# Patient Record
Sex: FEMALE | Race: BLACK OR AFRICAN AMERICAN | Employment: FULL TIME | URBAN - METROPOLITAN AREA
[De-identification: names, ages, dates, MRNs, and addresses within clinical notes are randomized per-mention and may not be internally consistent; named-entity substitution may affect disease eponyms.]

---

## 2018-02-26 ENCOUNTER — HOSPITAL ENCOUNTER (EMERGENCY)
Age: 53
Discharge: HOME | End: 2018-02-26
Attending: EMERGENCY MEDICINE
Payer: OTHER GOVERNMENT

## 2018-02-26 ENCOUNTER — APPOINTMENT (OUTPATIENT)
Dept: GENERAL RADIOLOGY | Age: 53
End: 2018-02-26
Attending: PHYSICIAN ASSISTANT
Payer: OTHER GOVERNMENT

## 2018-02-26 VITALS
TEMPERATURE: 98.4 F | HEIGHT: 66 IN | HEART RATE: 91 BPM | RESPIRATION RATE: 18 BRPM | DIASTOLIC BLOOD PRESSURE: 81 MMHG | OXYGEN SATURATION: 100 % | SYSTOLIC BLOOD PRESSURE: 125 MMHG | BODY MASS INDEX: 25.71 KG/M2 | WEIGHT: 160 LBS

## 2018-02-26 DIAGNOSIS — J06.9 ACUTE URI: Primary | ICD-10-CM

## 2018-02-26 PROCEDURE — 87081 CULTURE SCREEN ONLY: CPT | Performed by: EMERGENCY MEDICINE

## 2018-02-26 PROCEDURE — 99283 EMERGENCY DEPT VISIT LOW MDM: CPT

## 2018-02-26 PROCEDURE — 71046 X-RAY EXAM CHEST 2 VIEWS: CPT

## 2018-02-26 RX ORDER — BENZONATATE 100 MG/1
100 CAPSULE ORAL
Qty: 30 CAP | Refills: 0 | Status: SHIPPED | OUTPATIENT
Start: 2018-02-26 | End: 2018-03-05

## 2018-02-26 NOTE — ED PROVIDER NOTES
EMERGENCY DEPARTMENT HISTORY AND PHYSICAL EXAM    Date: 2/26/2018  Patient Name: Joao Antunez    History of Presenting Illness     Chief Complaint   Patient presents with    Cold Symptoms         History Provided By: Patient    Chief Complaint:[place]  Duration: 2 Days  Timing:  Acute and Constant  Location: head  Severity: Moderate  Modifying Factors: none  Associated Symptoms: sore throat, non productive cough, and vomiting    Additional History (Context):   5:18 PM   Joao Antunez is a 47 y.o. female with no significant PMHX who presents to the emergency department C/O head congestion. Associated sxs include sore throat, non productive cough, vomiting. Pt reports she has been sick for 2 days and believes she is vomiting because of her cough. Pt was being seen at [facility] for her Myasthenia gravis. Pt endorses sick contact with  (was sick for 7 days. Pt denies fever, chills, dizziness, headache, hx of DM, and any other sxs or complaints. PCP: JAQUELINE Abbott    Current Outpatient Prescriptions   Medication Sig Dispense Refill    benzonatate (TESSALON PERLES) 100 mg capsule Take 1 Cap by mouth three (3) times daily as needed for Cough for up to 7 days. 30 Cap 0    mycophenolate (CELLCEPT) 500 mg tablet Take 500 mg by mouth two (2) times a day.  predniSONE (DELTASONE) 2.5 mg tablet Take  by mouth daily (with breakfast).  norethindrone-e.estradiol-iron (LO LOESTRIN FE) 1 mg-10 mcg (24)/10 mcg (2) tab Take  by mouth.  levothyroxine (SYNTHROID) 100 mcg tablet Take 100 mcg by mouth Daily (before breakfast).  simvastatin (ZOCOR) 40 mg tablet Take  by mouth nightly. Past History     Past Medical History:  Past Medical History:   Diagnosis Date    Back pain     Hypercholesteremia     Myasthenia gravis ([address]     Thyroid disease     Tremor in face after taking mestinon. Past Surgical History:  History reviewed. No pertinent surgical history.     Family History:  History reviewed. No pertinent family history. Social History:  Social History   Substance Use Topics    Smoking status: Never Smoker    Smokeless tobacco: None    Alcohol use None       Allergies: Allergies   Allergen Reactions    Amoxicillin Hives    Pcn [Penicillins] Hives         Review of Systems   Review of Systems   Constitutional: Negative for chills and fever. HENT: Positive for congestion (head) and sore throat. Respiratory: Positive for cough (non productive). Gastrointestinal: Positive for vomiting. Neurological: Negative for dizziness and headaches. All other systems reviewed and are negative. Physical Exam     Vitals:    02/26/18 1658   BP: 125/81   Pulse: 91   Resp: 18   Temp: 98.4 °F (36.9 °C)   SpO2: 100%   Weight: 72.6 kg (160 lb)   Height: 5' 6\" (1.676 m)     Physical Exam   Constitutional: She is oriented to person, place, and time. She appears well-developed and well-nourished. No distress. HENT:   Head: Normocephalic and atraumatic. Right Ear: External ear normal.   Left Ear: External ear normal.   Nose: Nose normal.   Mouth/Throat: Oropharynx is clear and moist. No oropharyngeal exudate. Eyes: Conjunctivae and EOM are normal. Pupils are equal, round, and reactive to light. Neck: Normal range of motion. Neck supple. Cardiovascular: Normal rate and regular rhythm. Pulmonary/Chest: Effort normal and breath sounds normal. She has no wheezes. Dry hacking type cough   Abdominal: Soft. Bowel sounds are normal. She exhibits no distension. There is no tenderness. There is no rebound and no guarding. Musculoskeletal: Normal range of motion. She exhibits no edema or tenderness. Neurological: She is alert and oriented to person, place, and time. Skin: Skin is warm and dry. Psychiatric: She has a normal mood and affect. Her behavior is normal.   Nursing note and vitals reviewed.         Diagnostic Study Results     Labs -   No results found for this or any previous visit (from the past 12 hour(s)). Radiologic Studies -     CT Results  (Last 48 hours)    None        CXR Results  (Last 48 hours)    None        6:07 PM  RADIOLOGY FINDINGS  Chest X-ray shows NAP  Pending review by Radiologist  Recorded by Juanito Montague ED Scribe, as dictated by Michael Glover PA-C     Medications given in the ED-  Medications - No data to display      Medical Decision Making   I am the first provider for this patient. I reviewed the vital signs, available nursing notes, past medical history, past surgical history, family history and social history. Vital Signs-Reviewed the patient's vital signs. Pulse Oximetry Analysis - 100% on RA     Records Reviewed: Nursing Notes    Procedures:  Procedures    ED Course:   5:18 PM  Initial assessment performed. The patients presenting problems have been discussed, and they are in agreement with the care plan formulated and outlined with them. I have encouraged them to ask questions as they arise throughout their visit. Diagnosis and Disposition       DISCHARGE NOTE:  6:48 PM   April Chi's  results have been reviewed with her. She has been counseled regarding her diagnosis, treatment, and plan. She verbally conveys understanding and agreement of the signs, symptoms, diagnosis, treatment and prognosis and additionally agrees to follow up as discussed. She also agrees with the care-plan and conveys that all of her questions have been answered. I have also provided discharge instructions for her that include: educational information regarding their diagnosis and treatment, and list of reasons why they would want to return to the ED prior to their follow-up appointment, should her condition change. She has been provided with education for proper emergency department utilization. CLINICAL IMPRESSION:    1. Acute URI        PLAN:  1. D/C Home  2.    Current Discharge Medication List      START taking these medications    Details   benzonatate (TESSALON PERLES) 100 mg capsule Take 1 Cap by mouth three (3) times daily as needed for Cough for up to 7 days. Qty: 30 Cap, Refills: 0           3. Follow-up Information     Follow up With Details Comments [address] [place] Schedule an appointment as soon as possible for a visit in 2 days For primary care follow up [facility] [place] [address] EMERGENCY DEPT Go to As needed, if symptoms worsen 2 [address] [address] [phone]        _______________________________    Attestations: This note is prepared by Madison Hester, acting as Scribe for Kylah Taylor PA-C. Kylah Taylor PA-C:  The scribe's documentation has been prepared under my direction and personally reviewed by me in its entirety.   I confirm that the note above accurately reflects all work, treatment, procedures, and medical decision making performed by me.  _______________________________

## 2018-02-26 NOTE — ED TRIAGE NOTES
Patient with c/o cold-like sxs that started Saturday. Sxs include sore throat, cough, HA, congestion, post-tussive vomiting. Patient reports taking Mucinex, last admin around 1300.

## 2018-02-26 NOTE — DISCHARGE INSTRUCTIONS
Upper Respiratory Infection (Cold): Care Instructions  Your Care Instructions    An upper respiratory infection, or URI, is an infection of the nose, sinuses, or throat. URIs are spread by coughs, sneezes, and direct contact. The common cold is the most frequent kind of URI. The flu and sinus infections are other kinds of URIs. Almost all URIs are caused by viruses. Antibiotics won't cure them. But you can treat most infections with home care. This may include drinking lots of fluids and taking over-the-counter pain medicine. You will probably feel better in 4 to 10 days. The doctor has checked you carefully, but problems can develop later. If you notice any problems or new symptoms, get medical treatment right away. Follow-up care is a key part of your treatment and safety. Be sure to make and go to all appointments, and call your doctor if you are having problems. It's also a good idea to know your test results and keep a list of the medicines you take. How can you care for yourself at home? · To prevent dehydration, drink plenty of fluids, enough so that your urine is light yellow or clear like water. Choose water and other caffeine-free clear liquids until you feel better. If you have kidney, heart, or liver disease and have to limit fluids, talk with your doctor before you increase the amount of fluids you drink. · Take an over-the-counter pain medicine, such as acetaminophen (Tylenol), ibuprofen (Advil, Motrin), or naproxen (Aleve). Read and follow all instructions on the label. · Before you use cough and cold medicines, check the label. These medicines may not be safe for young children or for people with certain health problems. · Be careful when taking over-the-counter cold or flu medicines and Tylenol at the same time. Many of these medicines have acetaminophen, which is Tylenol. Read the labels to make sure that you are not taking more than the recommended dose.  Too much acetaminophen (Tylenol) can be harmful. · Get plenty of rest.  · Do not smoke or allow others to smoke around you. If you need help quitting, talk to your doctor about stop-smoking programs and medicines. These can increase your chances of quitting for good. When should you call for help? Call 911 anytime you think you may need emergency care. For example, call if:  ? · You have severe trouble breathing. ?Call your doctor now or seek immediate medical care if:  ? · You seem to be getting much sicker. ? · You have new or worse trouble breathing. ? · You have a new or higher fever. ? · You have a new rash. ? Watch closely for changes in your health, and be sure to contact your doctor if:  ? · You have a new symptom, such as a sore throat, an earache, or sinus pain. ? · You cough more deeply or more often, especially if you notice more mucus or a change in the color of your mucus. ? · You do not get better as expected. Where can you learn more? Go to http://stephanie-roxana.info/. Enter K506 in the search box to learn more about \"Upper Respiratory Infection (Cold): Care Instructions. \"  Current as of: May 12, 2017  Content Version: 11.4  © 4826-9395 [employer], Incorporated. Care instructions adapted under license by [employer] (which disclaims liability or warranty for this information). If you have questions about a medical condition or this instruction, always ask your healthcare professional. [place] [address] any warranty or liability for your use of this information.

## 2018-02-26 NOTE — LETTER
[facility] [place] 
[facility] 
[place] [id number] [phone] Work/School Note Date: 2/26/2018 To Whom It May concern: 
 
Rk Gregory was seen and treated today in the emergency room by the following provider(s): 
Attending Provider: Yamile Pierce MD 
Physician Assistant: JAQUELINE Denny. Rk Gregory may return to work on 2/28/2018.  
 
Sincerely, 
 
 
 
 
Franca Davies PA-C

## 2018-02-28 LAB
B-HEM STREP THROAT QL CULT: NEGATIVE
B-HEM STREP THROAT QL CULT: NORMAL
BACTERIA SPEC CULT: NORMAL
SERVICE CMNT-IMP: NORMAL

## 2019-01-27 ENCOUNTER — HOSPITAL ENCOUNTER (EMERGENCY)
Age: 54
Discharge: HOME | End: 2019-01-27
Attending: EMERGENCY MEDICINE
Payer: OTHER GOVERNMENT

## 2019-01-27 VITALS
BODY MASS INDEX: 24.11 KG/M2 | DIASTOLIC BLOOD PRESSURE: 74 MMHG | HEIGHT: 66 IN | RESPIRATION RATE: 14 BRPM | TEMPERATURE: 98 F | HEART RATE: 83 BPM | SYSTOLIC BLOOD PRESSURE: 115 MMHG | WEIGHT: 150 LBS | OXYGEN SATURATION: 100 %

## 2019-01-27 DIAGNOSIS — M43.6 ACUTE TORTICOLLIS: Primary | ICD-10-CM

## 2019-01-27 PROCEDURE — 96372 THER/PROPH/DIAG INJ SC/IM: CPT

## 2019-01-27 PROCEDURE — 74011250637 HC RX REV CODE- 250/637: Performed by: PHYSICIAN ASSISTANT

## 2019-01-27 PROCEDURE — 99283 EMERGENCY DEPT VISIT LOW MDM: CPT

## 2019-01-27 PROCEDURE — 74011250636 HC RX REV CODE- 250/636: Performed by: PHYSICIAN ASSISTANT

## 2019-01-27 RX ORDER — DIAZEPAM 5 MG/1
5 TABLET ORAL ONCE
Status: COMPLETED | OUTPATIENT
Start: 2019-01-27 | End: 2019-01-27

## 2019-01-27 RX ORDER — IBUPROFEN 600 MG/1
600 TABLET ORAL
Qty: 20 TAB | Refills: 0 | Status: SHIPPED | OUTPATIENT
Start: 2019-01-27 | End: 2022-08-26

## 2019-01-27 RX ORDER — KETOROLAC TROMETHAMINE 30 MG/ML
60 INJECTION, SOLUTION INTRAMUSCULAR; INTRAVENOUS
Status: COMPLETED | OUTPATIENT
Start: 2019-01-27 | End: 2019-01-27

## 2019-01-27 RX ORDER — HYDROCODONE BITARTRATE AND ACETAMINOPHEN 5; 325 MG/1; MG/1
1 TABLET ORAL
Qty: 10 TAB | Refills: 0 | Status: SHIPPED | OUTPATIENT
Start: 2019-01-27 | End: 2022-08-26

## 2019-01-27 RX ORDER — ONDANSETRON 4 MG/1
4 TABLET, ORALLY DISINTEGRATING ORAL
Status: COMPLETED | OUTPATIENT
Start: 2019-01-27 | End: 2019-01-27

## 2019-01-27 RX ORDER — CHLORZOXAZONE 500 MG/1
500 TABLET ORAL
Qty: 21 TAB | Refills: 0 | Status: SHIPPED | OUTPATIENT
Start: 2019-01-27 | End: 2022-08-26

## 2019-01-27 RX ORDER — HYDROCODONE BITARTRATE AND ACETAMINOPHEN 5; 325 MG/1; MG/1
1 TABLET ORAL
Status: COMPLETED | OUTPATIENT
Start: 2019-01-27 | End: 2019-01-27

## 2019-01-27 RX ADMIN — ONDANSETRON 4 MG: 4 TABLET, ORALLY DISINTEGRATING ORAL at 12:11

## 2019-01-27 RX ADMIN — DIAZEPAM 5 MG: 5 TABLET ORAL at 12:10

## 2019-01-27 RX ADMIN — HYDROCODONE BITARTRATE AND ACETAMINOPHEN 1 TABLET: 5; 325 TABLET ORAL at 12:10

## 2019-01-27 RX ADMIN — KETOROLAC TROMETHAMINE 60 MG: 30 INJECTION, SOLUTION INTRAMUSCULAR at 12:12

## 2019-01-27 NOTE — ED PROVIDER NOTES
EMERGENCY DEPARTMENT HISTORY AND PHYSICAL EXAM    Date: 1/27/2019  Patient Name: Emmanuel Cornejo    History of Presenting Illness     Chief Complaint   Patient presents with    Neck Pain         History Provided By: Patient    Chief Complaint: neck pain  Duration: \"this morning\"  Timing:  Constant  Location: left neck  Quality: Aching and Sharp  Severity: Moderate  Modifying Factors: pain radiated down the back when the  tried to give her a massage  Associated Symptoms: denies any other associated signs or symptoms    Additional History (Context):   11:56 AM  Emmanuel Cornejo is a 47 y.o. female with PMHX of back pain who presents to the emergency department C/O left neck pain. No associated sxs Pt reports she was stretching while getting gout of the bed when she heard a noise and is unable to move her neck now. The pain radiates up and down the left neck. When her  massaged her neck the pain radiated down her back.  notes the pt has been moving her mother at the rehab center. LNMP x3 weeks ago. PMHx includes thyroid disease, hypercholesteremia, tremor, and myasthenia gravis. Denies hx of neck surgeries. Pt denies new physical activites, numbness, weakness, tingling, back pain, and any other sxs or complaints.      PCP: JAQUELINE Canseco    Current Facility-Administered Medications   Medication Dose Route Frequency Provider Last Rate Last Dose    ketorolac tromethamine (TORADOL) 60 mg/2 mL injection 60 mg  60 mg IntraMUSCular NOW Danis Moe PA-C        diazePAM (VALIUM) tablet 5 mg  5 mg Oral ONCE Danis Moe PA-C        HYDROcodone-acetaminophen (NORCO) 5-325 mg per tablet 1 Tab  1 Tab Oral NOW Danis Moe PA-C        ondansetron (ZOFRAN ODT) tablet 4 mg  4 mg Oral NOW Danis Moe PA-C         Current Outpatient Medications   Medication Sig Dispense Refill    chlorzoxazone (PARAFON FORTE DSC) 500 mg tablet Take 1 Tab by mouth three (3) times daily as needed for Muscle Spasm(s). 21 Tab 0    HYDROcodone-acetaminophen (NORCO) 5-325 mg per tablet Take 1 Tab by mouth every four (4) hours as needed for Pain. Max Daily Amount: 6 Tabs. 10 Tab 0    ibuprofen (MOTRIN) 600 mg tablet Take 1 Tab by mouth every six (6) hours as needed for Pain. Take with food. 20 Tab 0    mycophenolate (CELLCEPT) 500 mg tablet Take 500 mg by mouth two (2) times a day.  norethindrone-e.estradiol-iron (LO LOESTRIN FE) 1 mg-10 mcg (24)/10 mcg (2) tab Take  by mouth.  levothyroxine (SYNTHROID) 100 mcg tablet Take 100 mcg by mouth Daily (before breakfast).  simvastatin (ZOCOR) 40 mg tablet Take  by mouth nightly. Past History     Past Medical History:  Past Medical History:   Diagnosis Date    Back pain     Hypercholesteremia     Myasthenia gravis ([address]     Thyroid disease     Tremor in face after taking mestinon. Past Surgical History:  History reviewed. No pertinent surgical history. Family History:  History reviewed. No pertinent family history. Social History:  Social History     Tobacco Use    Smoking status: Never Smoker    Smokeless tobacco: Never Used   Substance Use Topics    Alcohol use: No     Frequency: Never    Drug use: No       Allergies: Allergies   Allergen Reactions    Amoxicillin Hives    Pcn [Penicillins] Hives         Review of Systems   Review of Systems   Musculoskeletal: Positive for myalgias and neck pain (left neck pain). Neurological: Negative for numbness and headaches. (-) tingling   All other systems reviewed and are negative. Physical Exam     Vitals:    01/27/19 1123   BP: 115/74   Pulse: 83   Resp: 14   Temp: 98 °F (36.7 °C)   SpO2: 100%   Weight: 68 kg (150 lb)   Height: 5' 6\" (1.676 m)     Physical Exam   Constitutional: She is oriented to person, place, and time. She appears well-developed and well-nourished. No distress. AA female in NAD. Alert. Social smile. Limited rotation of neck. SO at bedside. HENT:   Head: Normocephalic and atraumatic. Right Ear: External ear normal.   Left Ear: External ear normal.   Eyes: Conjunctivae are normal.   Neck: Muscular tenderness present. No spinous process tenderness present. Decreased range of motion present. Cardiovascular: Normal rate, regular rhythm, normal heart sounds and intact distal pulses. Exam reveals no gallop and no friction rub. No murmur heard. Pulmonary/Chest: Effort normal and breath sounds normal. No accessory muscle usage. No tachypnea. She has no decreased breath sounds. She has no rhonchi. Musculoskeletal:   5/5 strength in BUE   Neurological: She is alert and oriented to person, place, and time. Skin: Skin is warm and dry. She is not diaphoretic. Psychiatric: She has a normal mood and affect. Judgment normal.   Nursing note and vitals reviewed. Diagnostic Study Results     Labs -   No results found for this or any previous visit (from the past 12 hour(s)). Radiologic Studies -   No orders to display     CT Results  (Last 48 hours)    None        CXR Results  (Last 48 hours)    None          Medications given in the ED-  Medications   ketorolac tromethamine (TORADOL) 60 mg/2 mL injection 60 mg (not administered)   diazePAM (VALIUM) tablet 5 mg (not administered)   HYDROcodone-acetaminophen (NORCO) 5-325 mg per tablet 1 Tab (not administered)   ondansetron (ZOFRAN ODT) tablet 4 mg (not administered)         Medical Decision Making   I am the first provider for this patient. I reviewed the vital signs, available nursing notes, past medical history, past surgical history, family history and social history. Vital Signs-Reviewed the patient's vital signs. Pulse Oximetry Analysis - 100% on RA     Records Reviewed: Nursing Notes and Old Medical Records    Provider Notes (Medical Decision Making): sprain, strain, cervical radiculopathy, tendonitis, torticollis. Doubt carotid dissection. No weakness or numbness. Procedures:  Procedures    ED Course:   11:56 AM  Initial assessment performed. The patients presenting problems have been discussed, and they are in agreement with the care plan formulated and outlined with them. I have encouraged them to ask questions as they arise throughout their visit. Diagnosis and Disposition     Will tx for torticollis. NVI. No weakness or numbness. Reasons to RTED discussed with pt. All questions answered. Pt feels comfortable going home at this time. Pt expressed understanding and she agrees with plan. DISCHARGE NOTE:  12:02 PM  Shae Chi's  results have been reviewed with her. She has been counseled regarding her diagnosis, treatment, and plan. She verbally conveys understanding and agreement of the signs, symptoms, diagnosis, treatment and prognosis and additionally agrees to follow up as discussed. She also agrees with the care-plan and conveys that all of her questions have been answered. I have also provided discharge instructions for her that include: educational information regarding their diagnosis and treatment, and list of reasons why they would want to return to the ED prior to their follow-up appointment, should her condition change. She has been provided with education for proper emergency department utilization. CLINICAL IMPRESSION:    1. Acute torticollis        PLAN:  1. D/C Home  2. Current Discharge Medication List      START taking these medications    Details   chlorzoxazone (PARAFON FORTE DSC) 500 mg tablet Take 1 Tab by mouth three (3) times daily as needed for Muscle Spasm(s). Qty: 21 Tab, Refills: 0      HYDROcodone-acetaminophen (NORCO) 5-325 mg per tablet Take 1 Tab by mouth every four (4) hours as needed for Pain. Max Daily Amount: 6 Tabs. Qty: 10 Tab, Refills: 0    Associated Diagnoses: Acute torticollis      ibuprofen (MOTRIN) 600 mg tablet Take 1 Tab by mouth every six (6) hours as needed for Pain. Take with food.   Qty: 20 Tab, Refills: 0           3. Follow-up Information     Follow up With Specialties Details Why Contact Info    JAQUELINE Etienne Physician Assistant Schedule an appointment as soon as possible for a visit in 2 days For primary care follow up [address] [address] [place] [address] EMERGENCY DEPT Emergency Medicine Go to As needed, if symptoms worsen [address] [place] [facility] [address]  [phone]        _______________________________    Attestations: This note is prepared by Matthew Tee, acting as Scribe for LOOKSIMADIONY. LOOKSIMA, DIONY:  The scribe's documentation has been prepared under my direction and personally reviewed by me in its entirety.   I confirm that the note above accurately reflects all work, treatment, procedures, and medical decision making performed by me.      ____________________________

## 2019-01-27 NOTE — ED TRIAGE NOTES
Pt ambulated into er with complaints of left sided neck pain that started this morning while stretching to get out of bed.

## 2019-01-27 NOTE — DISCHARGE INSTRUCTIONS
Torticollis: Care Instructions  Your Care Instructions  Torticollis is a severe tightness of the muscles on one side of the neck. The tight muscles can make the head turn to one side, lean to one side, or be pulled forward or backward. It is also called wryneck. Your doctor asked questions about your health and examined you. You may also have had X-rays or other tests. If your doctor thinks another medical problem is causing your tight neck muscles, you may need more tests. Torticollis usually gets better with home care. Your doctor may have you take medicine to relieve pain or relax your muscles. He or she may suggest exercise and physical therapy to help increase flexibility and relieve stress. Your doctor may also have you wear a special collar, called a cervical collar, for a day or two. The collar may help make your neck more comfortable. Follow-up care is a key part of your treatment and safety. Be sure to make and go to all appointments, and call your doctor if you are having problems. It's also a good idea to know your test results and keep a list of the medicines you take. How can you care for yourself at home? · Be safe with medicines. Read and follow all instructions on the label. ? If the doctor gave you a prescription medicine for pain, take it as prescribed. ? If you are not taking a prescription pain medicine, ask your doctor if you can take an over-the-counter medicine. · Try using a heating pad on a low or medium setting for 15 to 20 minutes every 2 or 3 hours. Try a warm shower in place of one session with the heating pad. · Try using an ice pack for 10 to 15 minutes every 2 to 3 hours. Put a thin cloth between the ice and your skin. · If your doctor recommends a cervical collar, wear it exactly as directed. When should you call for help?   Call your doctor now or seek immediate medical care if:    · You have new or worse numbness in your arms, buttocks, or legs.     · You have new or worse weakness in your arms or legs.     · Your neck pain gets worse.     · You lose bladder or bowel control.    Watch closely for changes in your health, and be sure to contact your doctor if:    · You do not get better as expected. Where can you learn more? Go to http://stephanie-roxana.info/. Enter X876 in the search box to learn more about \"Torticollis: Care Instructions. \"  Current as of: September 20, 2018  Content Version: 11.9  © 4307-0658 [employer]. Care instructions adapted under license by [employer] (which disclaims liability or warranty for this information). If you have questions about a medical condition or this instruction, always ask your healthcare professional. [address] any warranty or liability for your use of this information.

## 2022-01-22 ENCOUNTER — APPOINTMENT (OUTPATIENT)
Dept: GENERAL RADIOLOGY | Age: 57
End: 2022-01-22
Attending: PHYSICIAN ASSISTANT
Payer: OTHER GOVERNMENT

## 2022-01-22 ENCOUNTER — HOSPITAL ENCOUNTER (EMERGENCY)
Age: 57
Discharge: HOME | End: 2022-01-22
Attending: EMERGENCY MEDICINE
Payer: OTHER GOVERNMENT

## 2022-01-22 VITALS
SYSTOLIC BLOOD PRESSURE: 122 MMHG | DIASTOLIC BLOOD PRESSURE: 70 MMHG | HEART RATE: 76 BPM | TEMPERATURE: 97.3 F | RESPIRATION RATE: 18 BRPM | BODY MASS INDEX: 26.52 KG/M2 | OXYGEN SATURATION: 99 % | WEIGHT: 165 LBS | HEIGHT: 66 IN

## 2022-01-22 DIAGNOSIS — M25.562 ACUTE PAIN OF LEFT KNEE: Primary | ICD-10-CM

## 2022-01-22 PROCEDURE — 73564 X-RAY EXAM KNEE 4 OR MORE: CPT

## 2022-01-22 PROCEDURE — 99282 EMERGENCY DEPT VISIT SF MDM: CPT

## 2022-01-22 NOTE — ED TRIAGE NOTES
Pt ambulatory to triage in NAD pt c/o knee pain x 2 weeks. Pt states she may have \"tweaked it \" when she was on the treadmill. Pt was trying to do her p.t. from right foot surgery from  2 sept 2.   Pt  Feels as though she is over compensating on that side

## 2022-01-22 NOTE — ED PROVIDER NOTES
EMERGENCY DEPARTMENT HISTORY AND PHYSICAL EXAM    Date: 1/22/2022  Patient Name: Jorge Luis Frazier    History of Presenting Illness     Chief Complaint   Patient presents with    Leg Pain         History Provided By: Patient    6:12 PM  Jorge Luis Frazier is a 55 y.o. female who presents to the emergency department C/O left knee pain x2 weeks. Patient underwent right foot surgery for arthritis a few months ago. States she continues to have pain and decreased mobility so over the last few weeks she tried to walk on the treadmill and workout more to increase her range of motion but has slowly developed left knee pain. She denies injury or trauma or fall. Thinks she has been compensating and bearing more weight on the left leg due to her right foot issue. Pt denies calf pain, swelling, redness, history of gout, hip pain, ankle pain, and any other sxs or complaints. PCP: Tasha Euceda MD    Current Outpatient Medications   Medication Sig Dispense Refill    chlorzoxazone (PARAFON FORTE DSC) 500 mg tablet Take 1 Tab by mouth three (3) times daily as needed for Muscle Spasm(s). 21 Tab 0    HYDROcodone-acetaminophen (NORCO) 5-325 mg per tablet Take 1 Tab by mouth every four (4) hours as needed for Pain. Max Daily Amount: 6 Tabs. 10 Tab 0    ibuprofen (MOTRIN) 600 mg tablet Take 1 Tab by mouth every six (6) hours as needed for Pain. Take with food. 20 Tab 0    mycophenolate (CELLCEPT) 500 mg tablet Take 500 mg by mouth two (2) times a day.  norethindrone-e.estradiol-iron (LO LOESTRIN FE) 1 mg-10 mcg (24)/10 mcg (2) tab Take  by mouth.  levothyroxine (SYNTHROID) 100 mcg tablet Take 100 mcg by mouth Daily (before breakfast).  simvastatin (ZOCOR) 40 mg tablet Take  by mouth nightly. Past History     Past Medical History:  Past Medical History:   Diagnosis Date    Back pain     Hypercholesteremia     Myasthenia gravis ([facility] [address]     Thyroid disease     Tremor in face after taking mestinon. Past Surgical History:  History reviewed. No pertinent surgical history. Family History:  History reviewed. No pertinent family history. Social History:  Social History     Tobacco Use    Smoking status: Never Smoker    Smokeless tobacco: Never Used   Substance Use Topics    Alcohol use: No    Drug use: No       Allergies: Allergies   Allergen Reactions    Amoxicillin Hives    Pcn [Penicillins] Hives         Review of Systems   Review of Systems   Musculoskeletal: Positive for arthralgias and myalgias. Negative for joint swelling. Skin: Negative. Neurological: Negative for weakness and numbness. All other systems reviewed and are negative. Physical Exam     Vitals:    01/22/22 1730   BP: 122/70   Pulse: 76   Resp: 18   Temp: 97.3 °F (36.3 °C)   SpO2: 99%   Weight: 74.8 kg (165 lb)   Height: 5' 6\" (1.676 m)     Physical Exam  Vital signs and nursing notes reviewed. CONSTITUTIONAL: Alert. Well-appearing; well-nourished; in no apparent distress. HEAD: Normocephalic; atraumatic. EXT: LLE: Points to generalized anterior knee for pain, nontender to palpation without swelling, erythema, ecchymosis or deformity. No abnormal patellar motion. No laxity with anterior posterior drawer test.  No popliteal or calf swelling or tenderness. Hip ankle and lower leg nontender/atraumatic. Sensation intact. 2+ DP pulse. SKIN: Normal for age and race; warm; dry; good turgor; no apparent lesions or exudate. NEURO: A & O x3. PSYCH:  Mood and affect appropriate. Diagnostic Study Results     Labs -   No results found for this or any previous visit (from the past 12 hour(s)). Radiologic Studies -   X-ray left knee shows no acute process.   Pending review by radiologist.  XR KNEE LT MIN 4 V    (Results Pending)     CT Results  (Last 48 hours)    None        CXR Results  (Last 48 hours)    None          Medications given in the ED-  Medications - No data to display      Medical Decision Making   I am the first provider for this patient. I reviewed the vital signs, available nursing notes, past medical history, past surgical history, family history and social history. Vital Signs-Reviewed the patient's vital signs. Records Reviewed: Nursing Notes      Procedures:  Procedures    ED Course:  6:12 PM   Initial assessment performed. The patients presenting problems have been discussed, and they are in agreement with the care plan formulated and outlined with them. I have encouraged them to ask questions as they arise throughout their visit. Provider Notes (Medical Decision Making): Rusty Collier is a 55 y.o. female presents with 2 weeks of left knee pain. States walking differently to compensate for right foot pain status post surgery several months ago. Denies any injury or trauma but did have increased activity. Her left knee exam reveals no abnormalities, laxity. Left knee x-ray shows no acute process. Patient has a cane and crutches at home which she will use as needed in addition to a soft knee brace. Continue Tylenol and ibuprofen as needed for pain. Follow-up with orthopedist if symptoms do not improve after 1 to 2 weeks. Diagnosis and Disposition       DISCHARGE NOTE:    Dayna Carroll Arti's  results have been reviewed with her. She has been counseled regarding her diagnosis, treatment, and plan. She verbally conveys understanding and agreement of the signs, symptoms, diagnosis, treatment and prognosis and additionally agrees to follow up as discussed. She also agrees with the care-plan and conveys that all of her questions have been answered. I have also provided discharge instructions for her that include: educational information regarding their diagnosis and treatment, and list of reasons why they would want to return to the ED prior to their follow-up appointment, should her condition change.  She has been provided with education for proper emergency department utilization. CLINICAL IMPRESSION:    1. Acute pain of left knee        PLAN:  1. D/C Home  2. Current Discharge Medication List        3. Follow-up Information     Follow up With Specialties Details Why Contact Info    Beena Gupta MD Orthopedic Surgery Schedule an appointment as soon as possible for a visit   [place] [id number] [facility]      THE [facility] EMERGENCY DEPT Emergency Medicine  As needed, If symptoms worsen [address] [id number]  [phone]        _______________________________      Please note that this dictation was completed with [employer], the computer voice recognition software. Quite often unanticipated grammatical, syntax, homophones, and other interpretive errors are inadvertently transcribed by the computer software. Please disregard these errors. Please excuse any errors that have escaped final proofreading.

## 2022-08-26 ENCOUNTER — HOSPITAL ENCOUNTER (OUTPATIENT)
Dept: PREADMISSION TESTING | Age: 57
Discharge: HOME | End: 2022-08-26

## 2022-08-26 VITALS — WEIGHT: 150 LBS | BODY MASS INDEX: 24.11 KG/M2 | HEIGHT: 66 IN

## 2022-08-26 RX ORDER — SODIUM CHLORIDE, SODIUM LACTATE, POTASSIUM CHLORIDE, CALCIUM CHLORIDE 600; 310; 30; 20 MG/100ML; MG/100ML; MG/100ML; MG/100ML
125 INJECTION, SOLUTION INTRAVENOUS CONTINUOUS
Status: CANCELLED | OUTPATIENT
Start: 2022-08-26

## 2022-08-26 RX ORDER — ROSUVASTATIN CALCIUM 20 MG/1
20 TABLET, COATED ORAL
COMMUNITY

## 2022-08-26 RX ORDER — BISMUTH SUBSALICYLATE 262 MG
1 TABLET,CHEWABLE ORAL DAILY
COMMUNITY

## 2022-08-26 RX ORDER — LEVOTHYROXINE SODIUM 125 UG/1
TABLET ORAL
COMMUNITY

## 2022-08-26 NOTE — PERIOP NOTES
PAT phone interview completed. Patient made aware to be NPO. Patient stated she had two doses of COVID vaccine. Patient made aware to bring proof of COVID vaccination and not to get COVID test. Patient made aware not to wear jewelry, makeup, nail polish, lotion, oil or spray on DOS. Reviewed surgical skin preparation with patient. Patient stated understanding. Opportunity for questions given. Reviewed expectations of discharge and length of stay. Patient stated she has a ride for discharge and someone to stay with her for 24 hours after procedure. Patient denies PILI, difficult intubation/airway, MH, PONV, pseudocholinesterase deficiency, research studies or clinical trials, or prosthetics. Patient stated she does not have a DNR order. Patient stated she will come 8/29 or 8/30 for labs. Fax sent to office to clarify laterality on surgical procedure portion of surgical scheduling sheet.

## 2022-08-29 ENCOUNTER — HOSPITAL ENCOUNTER (OUTPATIENT)
Dept: PREADMISSION TESTING | Age: 57
Discharge: HOME | End: 2022-08-29
Payer: OTHER GOVERNMENT

## 2022-08-29 LAB
ANION GAP SERPL CALC-SCNC: 3 MMOL/L (ref 3–18)
BUN SERPL-MCNC: 13 MG/DL (ref 7–18)
BUN/CREAT SERPL: 14 (ref 12–20)
CALCIUM SERPL-MCNC: 9 MG/DL (ref 8.5–10.1)
CHLORIDE SERPL-SCNC: 105 MMOL/L (ref 100–111)
CO2 SERPL-SCNC: 28 MMOL/L (ref 21–32)
CREAT SERPL-MCNC: 0.94 MG/DL (ref 0.6–1.3)
GLUCOSE SERPL-MCNC: 81 MG/DL (ref 74–99)
HCT VFR BLD AUTO: 30.6 % (ref 35–45)
HGB BLD-MCNC: 9 G/DL (ref 12–16)
POTASSIUM SERPL-SCNC: 4 MMOL/L (ref 3.5–5.5)
SODIUM SERPL-SCNC: 136 MMOL/L (ref 136–145)

## 2022-08-29 PROCEDURE — 85018 HEMOGLOBIN: CPT

## 2022-08-29 PROCEDURE — 36415 COLL VENOUS BLD VENIPUNCTURE: CPT

## 2022-08-29 PROCEDURE — 80048 BASIC METABOLIC PNL TOTAL CA: CPT

## 2022-08-30 NOTE — PERIOP NOTES
Pt returned call in regard to preop EKG. Pt states she is unable to come in prior to DOS for EKG. Pt states she will have it done on dos. Pt instructed to come in 30 minutes prior to given arrival time. Also informed patient that surgery could be postpone on dos if EKG is abnormal. Notified Claudia Kelly at Dr. Tammy Carl office.

## 2022-08-30 NOTE — PERIOP NOTES
Labs faxed to patient PCP at [facility] [phone] [facility])

## 2022-08-30 NOTE — PERIOP NOTES
Anesthesia request for clearance for anemia and myasthenia gravis, and H/H faxed to Dr. Deon Taylor office. Notified Dr. Deon Taylor office staff.

## 2022-09-02 ENCOUNTER — HOSPITAL ENCOUNTER (OUTPATIENT)
Dept: PREADMISSION TESTING | Age: 57
Discharge: HOME | End: 2022-09-02
Payer: OTHER GOVERNMENT

## 2022-09-02 PROCEDURE — 93005 ELECTROCARDIOGRAM TRACING: CPT

## 2022-09-03 LAB
ATRIAL RATE: 65 BPM
CALCULATED P AXIS, ECG09: 65 DEGREES
CALCULATED R AXIS, ECG10: 52 DEGREES
CALCULATED T AXIS, ECG11: 55 DEGREES
DIAGNOSIS, 93000: NORMAL
P-R INTERVAL, ECG05: 182 MS
Q-T INTERVAL, ECG07: 382 MS
QRS DURATION, ECG06: 74 MS
QTC CALCULATION (BEZET), ECG08: 397 MS
VENTRICULAR RATE, ECG03: 65 BPM

## 2022-12-08 ENCOUNTER — HOSPITAL ENCOUNTER (EMERGENCY)
Age: 57
Discharge: HOME | End: 2022-12-08
Attending: EMERGENCY MEDICINE
Payer: OTHER GOVERNMENT

## 2022-12-08 VITALS
OXYGEN SATURATION: 100 % | WEIGHT: 155 LBS | DIASTOLIC BLOOD PRESSURE: 87 MMHG | HEIGHT: 66 IN | SYSTOLIC BLOOD PRESSURE: 124 MMHG | BODY MASS INDEX: 24.91 KG/M2 | RESPIRATION RATE: 16 BRPM | HEART RATE: 71 BPM | TEMPERATURE: 98.3 F

## 2022-12-08 DIAGNOSIS — R59.0 LYMPHADENOPATHY, CERVICAL: Primary | ICD-10-CM

## 2022-12-08 PROCEDURE — 99283 EMERGENCY DEPT VISIT LOW MDM: CPT

## 2022-12-08 RX ORDER — CLINDAMYCIN HYDROCHLORIDE 150 MG/1
300 CAPSULE ORAL 3 TIMES DAILY
Qty: 42 CAPSULE | Refills: 0 | Status: SHIPPED | OUTPATIENT
Start: 2022-12-08 | End: 2022-12-15

## 2022-12-08 NOTE — ED PROVIDER NOTES
EMERGENCY DEPARTMENT HISTORY AND PHYSICAL EXAM    Date: 12/8/2022  Patient Name: Zoltan Zabala    History of Presenting Illness     Chief Complaint   Patient presents with    Sore Throat         History Provided By: Patient    Chief Complaint: sore throat    HPI(Context):   4:35 PM  Zoltan Zabala is a 65 y.o. female with PMHX of MG, HLP who presents to the emergency department C/O sore throat. Associated sxs include tenderness to neck. Pt notes swollen area to left side of neck with tender lump. Area is TTP. Pt denies fever, chills, trouble swallowing, and any other sxs or complaints. PCP: Scott Peters    Current Outpatient Medications   Medication Sig Dispense Refill    clindamycin (CLEOCIN) 150 mg capsule Take 2 Capsules by mouth three (3) times daily for 7 days. 42 Capsule 0    levothyroxine (SYNTHROID) 125 mcg tablet Take  by mouth Daily (before breakfast). rosuvastatin (CRESTOR) 20 mg tablet Take 20 mg by mouth nightly. multivitamin (ONE A DAY) tablet Take 1 Tablet by mouth daily. TURMERIC PO Take  by mouth.      mycophenolate (CELLCEPT) 500 mg tablet Take 500 mg by mouth two (2) times a day. (Patient not taking: Reported on 8/26/2022)      norethindrone-e.estradioL-iron 1 mg-10 mcg (24)/10 mcg (2) tab Take  by mouth. Past History     Past Medical History:  Past Medical History:   Diagnosis Date    Back pain     Hypercholesteremia     Hypothyroid 1994    Myasthenia gravis ([facility] [address] 2015    Tremor in face after taking mestinon. Past Surgical History:  Past Surgical History:   Procedure Laterality Date    HX ORTHOPAEDIC  09/02/2021    right great toe implant       Family History:  History reviewed. No pertinent family history. Social History:  Social History     Tobacco Use    Smoking status: Never    Smokeless tobacco: Never   Substance Use Topics    Alcohol use: No    Drug use: No       Allergies:   Allergies   Allergen Reactions    Amoxicillin Hives    Pcn [Penicillins] Hives         Review of Systems   Review of Systems   Constitutional:  Negative for chills and fever. HENT:  Positive for sore throat. Negative for trouble swallowing. Gastrointestinal:  Negative for vomiting. Hematological:  Positive for adenopathy. All other systems reviewed and are negative. Physical Exam     Vitals:    12/08/22 1442   BP: 124/87   Pulse: 71   Resp: 16   Temp: 98.3 °F (36.8 °C)   SpO2: 100%   Weight: 70.3 kg (155 lb)   Height: 5' 6\" (1.676 m)     Physical Exam  Vitals and nursing note reviewed. Constitutional:       General: She is not in acute distress. Appearance: She is well-developed. She is not diaphoretic. Comments: AA female in NAD. Alert. Appears comfortable. Handling secretions   HENT:      Head: Normocephalic and atraumatic. Right Ear: External ear normal.      Left Ear: External ear normal.      Nose: Nose normal.      Mouth/Throat:      Mouth: No oral lesions. Dentition: No dental abscesses. Pharynx: Uvula midline. No oropharyngeal exudate, posterior oropharyngeal erythema or uvula swelling. Tonsils: No tonsillar abscesses. Eyes:      General: No scleral icterus. Right eye: No discharge. Left eye: No discharge. Conjunctiva/sclera: Conjunctivae normal.   Cardiovascular:      Rate and Rhythm: Normal rate and regular rhythm. Heart sounds: Normal heart sounds. No murmur heard. No friction rub. No gallop. Pulmonary:      Effort: Pulmonary effort is normal. No tachypnea, accessory muscle usage or respiratory distress. Breath sounds: Normal breath sounds. No decreased breath sounds, wheezing, rhonchi or rales. Musculoskeletal:         General: Normal range of motion. Cervical back: Normal range of motion and neck supple. Lymphadenopathy:      Cervical: Cervical adenopathy (left cervical) present. Skin:     General: Skin is warm and dry.    Neurological:      Mental Status: She is alert and oriented to person, place, and time. Psychiatric:         Judgment: Judgment normal.           Diagnostic Study Results     Labs -   No results found for this or any previous visit (from the past 12 hour(s)). Radiologic Studies   No orders to display     CT Results  (Last 48 hours)      None          CXR Results  (Last 48 hours)      None            Medications given in the ED-  Medications - No data to display      Medical Decision Making   I am the first provider for this patient. I reviewed the vital signs, available nursing notes, past medical history, past surgical history, family history and social history. Vital Signs-Reviewed the patient's vital signs. Pulse Oximetry Analysis - 100% on RA. NORMAL     Records Reviewed: Nursing Notes    Provider Notes (Medical Decision Making): lymphadenopathy, strep, mono, GERD, malignancy    Procedures:  Procedures    ED Course:   4:35 PM Initial assessment performed. The patients presenting problems have been discussed, and they are in agreement with the care plan formulated and outlined with them. I have encouraged them to ask questions as they arise throughout their visit. Diagnosis and Disposition       Will tx with ABX for cervical lymphadenopathy. Oropharynx clear. Handling secretions. No PTA or RPA. FU with ENT as malignancy not ruled out. Pt and her  expressed understanding. Reasons to RTED discussed with pt. All questions answered. Pt feels comfortable going home at this time. Pt expressed understanding and she agrees with plan. 1. Lymphadenopathy, cervical        PLAN:  1. D/C Home  2. Discharge Medication List as of 12/8/2022  6:27 PM        START taking these medications    Details   clindamycin (CLEOCIN) 150 mg capsule Take 2 Capsules by mouth three (3) times daily for 7 days. , Normal, Disp-42 Capsule, R-0           CONTINUE these medications which have NOT CHANGED    Details   levothyroxine (SYNTHROID) 125 mcg tablet Take  by mouth Daily (before breakfast). , Historical Med      rosuvastatin (CRESTOR) 20 mg tablet Take 20 mg by mouth nightly., Historical Med      multivitamin (ONE A DAY) tablet Take 1 Tablet by mouth daily. , Historical Med      TURMERIC PO Take  by mouth., Historical Med      mycophenolate (CELLCEPT) 500 mg tablet Take 500 mg by mouth two (2) times a day., Historical Med      norethindrone-e.estradioL-iron 1 mg-10 mcg (24)/10 mcg (2) tab Take  by mouth., Historical Med           3. Follow-up Information       Follow up With Specialties Details Why Contact Info    Karen Wu MD Otolaryngology, Surgery Physician  follow up with ear, nose, and throat specialist. [employer]   [phone]      Brook De La Rosa Physician Assistant   [address] [place] [address]  [phone]      THE [facility] EMERGENCY DEPT Emergency Medicine   [address]  [phone]          _______________________________    Attestations: This note is prepared by Pedro Marie PA-C.  _______________________________        Please note that this dictation was completed with [employer], the computer voice recognition software. Quite often unanticipated grammatical, syntax, homophones, and other interpretive errors are inadvertently transcribed by the computer software. Please disregard these errors. Please excuse any errors that have escaped final proofreading.   mass

## 2023-03-23 ENCOUNTER — HOSPITAL ENCOUNTER (OUTPATIENT)
Facility: HOSPITAL | Age: 58
Discharge: HOME | End: 2023-03-23
Payer: OTHER GOVERNMENT

## 2023-03-23 VITALS — WEIGHT: 168.6 LBS | HEIGHT: 66 IN | BODY MASS INDEX: 27.1 KG/M2

## 2023-03-23 LAB
ANION GAP SERPL CALC-SCNC: 4 MMOL/L (ref 3–18)
BUN SERPL-MCNC: 10 MG/DL (ref 7–18)
BUN/CREAT SERPL: 9 (ref 12–20)
CALCIUM SERPL-MCNC: 9.4 MG/DL (ref 8.5–10.1)
CHLORIDE SERPL-SCNC: 106 MMOL/L (ref 100–111)
CO2 SERPL-SCNC: 29 MMOL/L (ref 21–32)
CREAT SERPL-MCNC: 1.08 MG/DL (ref 0.6–1.3)
ERYTHROCYTE [DISTWIDTH] IN BLOOD BY AUTOMATED COUNT: 14.7 % (ref 11.6–14.5)
GLUCOSE SERPL-MCNC: 84 MG/DL (ref 74–99)
HCT VFR BLD AUTO: 38 % (ref 35–45)
HGB BLD-MCNC: 12.3 G/DL (ref 12–16)
MCH RBC QN AUTO: 29.9 PG (ref 24–34)
MCHC RBC AUTO-ENTMCNC: 32.4 G/DL (ref 31–37)
MCV RBC AUTO: 92.2 FL (ref 78–100)
NRBC # BLD: 0 K/UL (ref 0–0.01)
NRBC BLD-RTO: 0 PER 100 WBC
PLATELET # BLD AUTO: 275 K/UL (ref 135–420)
PMV BLD AUTO: 9.6 FL (ref 9.2–11.8)
POTASSIUM SERPL-SCNC: 4.5 MMOL/L (ref 3.5–5.5)
RBC # BLD AUTO: 4.12 M/UL (ref 4.2–5.3)
SODIUM SERPL-SCNC: 139 MMOL/L (ref 136–145)
WBC # BLD AUTO: 5.2 K/UL (ref 4.6–13.2)

## 2023-03-23 PROCEDURE — 93005 ELECTROCARDIOGRAM TRACING: CPT | Performed by: PODIATRIST

## 2023-03-23 PROCEDURE — 36415 COLL VENOUS BLD VENIPUNCTURE: CPT

## 2023-03-23 PROCEDURE — 80048 BASIC METABOLIC PNL TOTAL CA: CPT

## 2023-03-23 PROCEDURE — 85027 COMPLETE CBC AUTOMATED: CPT

## 2023-03-23 NOTE — PERIOP NOTE
Neck 13.25 inches. Denies sleep apnea, diabetes, liver, or kidney diseases. Patient has Myasthenia Gravis. Not on Lithium or Digoxin.

## 2023-03-24 LAB
EKG ATRIAL RATE: 72 BPM
EKG DIAGNOSIS: NORMAL
EKG P AXIS: 73 DEGREES
EKG P-R INTERVAL: 186 MS
EKG Q-T INTERVAL: 356 MS
EKG QRS DURATION: 82 MS
EKG QTC CALCULATION (BAZETT): 389 MS
EKG R AXIS: 59 DEGREES
EKG T AXIS: 58 DEGREES
EKG VENTRICULAR RATE: 72 BPM

## 2023-03-30 ENCOUNTER — ANESTHESIA EVENT (OUTPATIENT)
Facility: HOSPITAL | Age: 58
End: 2023-03-30
Payer: OTHER GOVERNMENT

## 2023-03-30 RX ORDER — ONDANSETRON 2 MG/ML
4 INJECTION INTRAMUSCULAR; INTRAVENOUS
Status: CANCELLED | OUTPATIENT
Start: 2023-03-30 | End: 2023-03-31

## 2023-03-30 RX ORDER — FENTANYL CITRATE 50 UG/ML
25 INJECTION, SOLUTION INTRAMUSCULAR; INTRAVENOUS EVERY 5 MIN PRN
Status: CANCELLED | OUTPATIENT
Start: 2023-03-30

## 2023-03-30 RX ORDER — MEPERIDINE HYDROCHLORIDE 50 MG/ML
12.5 INJECTION INTRAMUSCULAR; INTRAVENOUS; SUBCUTANEOUS ONCE
Status: CANCELLED | OUTPATIENT
Start: 2023-03-30 | End: 2023-03-30

## 2023-03-30 RX ORDER — OXYCODONE HYDROCHLORIDE 5 MG/1
5 TABLET ORAL
Status: CANCELLED | OUTPATIENT
Start: 2023-03-30 | End: 2023-03-31

## 2023-03-30 RX ORDER — DIPHENHYDRAMINE HYDROCHLORIDE 50 MG/ML
12.5 INJECTION INTRAMUSCULAR; INTRAVENOUS
Status: CANCELLED | OUTPATIENT
Start: 2023-03-30 | End: 2023-03-31

## 2023-03-30 RX ORDER — SODIUM CHLORIDE, SODIUM LACTATE, POTASSIUM CHLORIDE, CALCIUM CHLORIDE 600; 310; 30; 20 MG/100ML; MG/100ML; MG/100ML; MG/100ML
INJECTION, SOLUTION INTRAVENOUS CONTINUOUS
Status: CANCELLED | OUTPATIENT
Start: 2023-03-30

## 2023-03-30 RX ORDER — LABETALOL HYDROCHLORIDE 5 MG/ML
10 INJECTION, SOLUTION INTRAVENOUS
Status: CANCELLED | OUTPATIENT
Start: 2023-03-30

## 2023-03-30 RX ORDER — HYDROMORPHONE HYDROCHLORIDE 1 MG/ML
0.5 INJECTION, SOLUTION INTRAMUSCULAR; INTRAVENOUS; SUBCUTANEOUS EVERY 5 MIN PRN
Status: CANCELLED | OUTPATIENT
Start: 2023-03-30

## 2023-03-30 RX ORDER — SODIUM CHLORIDE 0.9 % (FLUSH) 0.9 %
5-40 SYRINGE (ML) INJECTION EVERY 12 HOURS SCHEDULED
Status: CANCELLED | OUTPATIENT
Start: 2023-03-30

## 2023-03-30 RX ORDER — SODIUM CHLORIDE 0.9 % (FLUSH) 0.9 %
5-40 SYRINGE (ML) INJECTION PRN
Status: CANCELLED | OUTPATIENT
Start: 2023-03-30

## 2023-03-30 RX ORDER — SODIUM CHLORIDE 9 MG/ML
INJECTION, SOLUTION INTRAVENOUS PRN
Status: CANCELLED | OUTPATIENT
Start: 2023-03-30

## 2023-03-30 RX ORDER — IPRATROPIUM BROMIDE AND ALBUTEROL SULFATE 2.5; .5 MG/3ML; MG/3ML
1 SOLUTION RESPIRATORY (INHALATION)
Status: CANCELLED | OUTPATIENT
Start: 2023-03-30 | End: 2023-03-31

## 2023-03-30 RX ORDER — DROPERIDOL 2.5 MG/ML
0.62 INJECTION, SOLUTION INTRAMUSCULAR; INTRAVENOUS
Status: CANCELLED | OUTPATIENT
Start: 2023-03-30 | End: 2023-03-31

## 2023-03-31 ENCOUNTER — APPOINTMENT (OUTPATIENT)
Facility: HOSPITAL | Age: 58
End: 2023-03-31
Attending: PODIATRIST
Payer: OTHER GOVERNMENT

## 2023-03-31 ENCOUNTER — HOSPITAL ENCOUNTER (OUTPATIENT)
Facility: HOSPITAL | Age: 58
Setting detail: OUTPATIENT SURGERY
Discharge: HOME | End: 2023-03-31
Attending: PODIATRIST | Admitting: PODIATRIST
Payer: OTHER GOVERNMENT

## 2023-03-31 ENCOUNTER — ANESTHESIA (OUTPATIENT)
Facility: HOSPITAL | Age: 58
End: 2023-03-31
Payer: OTHER GOVERNMENT

## 2023-03-31 VITALS
DIASTOLIC BLOOD PRESSURE: 62 MMHG | HEART RATE: 66 BPM | TEMPERATURE: 97.8 F | OXYGEN SATURATION: 100 % | HEIGHT: 66 IN | RESPIRATION RATE: 15 BRPM | BODY MASS INDEX: 27.53 KG/M2 | SYSTOLIC BLOOD PRESSURE: 115 MMHG | WEIGHT: 171.3 LBS

## 2023-03-31 PROBLEM — M20.21 HALLUX RIGIDUS OF RIGHT FOOT: Status: RESOLVED | Noted: 2023-03-31 | Resolved: 2023-03-31

## 2023-03-31 PROBLEM — M20.21 HALLUX RIGIDUS OF RIGHT FOOT: Status: ACTIVE | Noted: 2023-03-31

## 2023-03-31 PROBLEM — M79.671 RIGHT FOOT PAIN: Status: ACTIVE | Noted: 2023-03-31

## 2023-03-31 LAB — HCG UR QL: NEGATIVE

## 2023-03-31 PROCEDURE — 2500000003 HC RX 250 WO HCPCS: Performed by: PODIATRIST

## 2023-03-31 PROCEDURE — 3600000012 HC SURGERY LEVEL 2 ADDTL 15MIN: Performed by: PODIATRIST

## 2023-03-31 PROCEDURE — 2500000003 HC RX 250 WO HCPCS: Performed by: NURSE ANESTHETIST, CERTIFIED REGISTERED

## 2023-03-31 PROCEDURE — 3700000000 HC ANESTHESIA ATTENDED CARE: Performed by: PODIATRIST

## 2023-03-31 PROCEDURE — 6360000002 HC RX W HCPCS: Performed by: NURSE ANESTHETIST, CERTIFIED REGISTERED

## 2023-03-31 PROCEDURE — 2580000003 HC RX 258: Performed by: PODIATRIST

## 2023-03-31 PROCEDURE — 6360000002 HC RX W HCPCS: Performed by: PODIATRIST

## 2023-03-31 PROCEDURE — C1763 CONN TISS, NON-HUMAN: HCPCS | Performed by: PODIATRIST

## 2023-03-31 PROCEDURE — 6370000000 HC RX 637 (ALT 250 FOR IP): Performed by: PODIATRIST

## 2023-03-31 PROCEDURE — 3600000002 HC SURGERY LEVEL 2 BASE: Performed by: PODIATRIST

## 2023-03-31 PROCEDURE — 7100000011 HC PHASE II RECOVERY - ADDTL 15 MIN: Performed by: PODIATRIST

## 2023-03-31 PROCEDURE — 7100000010 HC PHASE II RECOVERY - FIRST 15 MIN: Performed by: PODIATRIST

## 2023-03-31 PROCEDURE — 81025 URINE PREGNANCY TEST: CPT

## 2023-03-31 PROCEDURE — 3700000001 HC ADD 15 MINUTES (ANESTHESIA): Performed by: PODIATRIST

## 2023-03-31 PROCEDURE — A4217 STERILE WATER/SALINE, 500 ML: HCPCS | Performed by: PODIATRIST

## 2023-03-31 PROCEDURE — 2709999900 HC NON-CHARGEABLE SUPPLY: Performed by: PODIATRIST

## 2023-03-31 PROCEDURE — C1776 JOINT DEVICE (IMPLANTABLE): HCPCS | Performed by: PODIATRIST

## 2023-03-31 DEVICE — IMPLANTABLE DEVICE
Type: IMPLANTABLE DEVICE | Site: TOES | Status: FUNCTIONAL
Brand: TOE POROUS COATED MEDIUM LARGE 21.5MM

## 2023-03-31 DEVICE — IMPLANTABLE DEVICE: Type: IMPLANTABLE DEVICE | Site: TOES | Status: FUNCTIONAL

## 2023-03-31 RX ORDER — FENTANYL CITRATE 50 UG/ML
INJECTION, SOLUTION INTRAMUSCULAR; INTRAVENOUS PRN
Status: DISCONTINUED | OUTPATIENT
Start: 2023-03-31 | End: 2023-03-31 | Stop reason: SDUPTHER

## 2023-03-31 RX ORDER — MIDAZOLAM HYDROCHLORIDE 1 MG/ML
INJECTION INTRAMUSCULAR; INTRAVENOUS PRN
Status: DISCONTINUED | OUTPATIENT
Start: 2023-03-31 | End: 2023-03-31 | Stop reason: SDUPTHER

## 2023-03-31 RX ORDER — PROPOFOL 10 MG/ML
INJECTION, EMULSION INTRAVENOUS PRN
Status: DISCONTINUED | OUTPATIENT
Start: 2023-03-31 | End: 2023-03-31 | Stop reason: SDUPTHER

## 2023-03-31 RX ORDER — CLINDAMYCIN PHOSPHATE 900 MG/50ML
900 INJECTION INTRAVENOUS
Status: COMPLETED | OUTPATIENT
Start: 2023-03-31 | End: 2023-03-31

## 2023-03-31 RX ORDER — SODIUM CHLORIDE, SODIUM LACTATE, POTASSIUM CHLORIDE, CALCIUM CHLORIDE 600; 310; 30; 20 MG/100ML; MG/100ML; MG/100ML; MG/100ML
INJECTION, SOLUTION INTRAVENOUS CONTINUOUS
Status: DISCONTINUED | OUTPATIENT
Start: 2023-03-31 | End: 2023-03-31 | Stop reason: HOSPADM

## 2023-03-31 RX ORDER — EPHEDRINE SULFATE/0.9% NACL/PF 50 MG/5 ML
SYRINGE (ML) INTRAVENOUS PRN
Status: DISCONTINUED | OUTPATIENT
Start: 2023-03-31 | End: 2023-03-31 | Stop reason: SDUPTHER

## 2023-03-31 RX ORDER — CHLORHEXIDINE GLUCONATE 4 G/100ML
SOLUTION TOPICAL ONCE
Status: COMPLETED | OUTPATIENT
Start: 2023-03-31 | End: 2023-03-31

## 2023-03-31 RX ADMIN — PROPOFOL 40 MG: 10 INJECTION, EMULSION INTRAVENOUS at 14:00

## 2023-03-31 RX ADMIN — SODIUM CHLORIDE, SODIUM LACTATE, POTASSIUM CHLORIDE, AND CALCIUM CHLORIDE: 600; 310; 30; 20 INJECTION, SOLUTION INTRAVENOUS at 15:20

## 2023-03-31 RX ADMIN — CLINDAMYCIN PHOSPHATE 900 MG: 900 INJECTION, SOLUTION INTRAVENOUS at 13:52

## 2023-03-31 RX ADMIN — MIDAZOLAM 2 MG: 1 INJECTION INTRAMUSCULAR; INTRAVENOUS at 13:50

## 2023-03-31 RX ADMIN — FENTANYL CITRATE 25 MCG: 50 INJECTION, SOLUTION INTRAMUSCULAR; INTRAVENOUS at 13:55

## 2023-03-31 RX ADMIN — CHLORHEXIDINE GLUCONATE: 213 SOLUTION TOPICAL at 10:17

## 2023-03-31 RX ADMIN — PROPOFOL 20 MG: 10 INJECTION, EMULSION INTRAVENOUS at 14:22

## 2023-03-31 RX ADMIN — FENTANYL CITRATE 25 MCG: 50 INJECTION, SOLUTION INTRAMUSCULAR; INTRAVENOUS at 13:56

## 2023-03-31 RX ADMIN — Medication 5 MG: at 14:34

## 2023-03-31 RX ADMIN — Medication 5 MG: at 14:27

## 2023-03-31 RX ADMIN — PROPOFOL 30 MG: 10 INJECTION, EMULSION INTRAVENOUS at 13:55

## 2023-03-31 RX ADMIN — SODIUM CHLORIDE, SODIUM LACTATE, POTASSIUM CHLORIDE, AND CALCIUM CHLORIDE: 600; 310; 30; 20 INJECTION, SOLUTION INTRAVENOUS at 10:30

## 2023-03-31 RX ADMIN — PROPOFOL 75 MCG/KG/MIN: 10 INJECTION, EMULSION INTRAVENOUS at 13:57

## 2023-03-31 RX ADMIN — PROPOFOL 30 MG: 10 INJECTION, EMULSION INTRAVENOUS at 13:59

## 2023-03-31 RX ADMIN — FENTANYL CITRATE 25 MCG: 50 INJECTION, SOLUTION INTRAMUSCULAR; INTRAVENOUS at 14:23

## 2023-03-31 ASSESSMENT — PAIN - FUNCTIONAL ASSESSMENT: PAIN_FUNCTIONAL_ASSESSMENT: 0-10

## 2023-03-31 ASSESSMENT — PAIN SCALES - GENERAL: PAINLEVEL_OUTOF10: 0

## 2023-03-31 NOTE — DISCHARGE INSTRUCTIONS
healthy lifestyle    You may be retaining fluid if you have a history of heart failure or if you experience any of the following symptoms:  Weight gain of 3 pounds or more overnight or 5 pounds in a week, increased swelling in our hands or feet or shortness of breath while lying flat in bed. Please call your doctor as soon as you notice any of these symptoms; do not wait until your next office visit. Patient armband removed and shredded     The discharge information has been reviewed with the patient and caregiver. The patient and caregiver verbalized understanding. Discharge medications reviewed with the patient and caregiver and appropriate educational materials and side effects teaching were provided.   ___________________________________________________________________________________________________________________________________

## 2023-03-31 NOTE — ANESTHESIA PRE PROCEDURE
Procedure Laterality Date    ORTHOPEDIC SURGERY  09/02/2021    right great toe implant       Social History:    Social History     Tobacco Use    Smoking status: Never    Smokeless tobacco: Never   Substance Use Topics    Alcohol use: No                                Counseling given: Not Answered      Vital Signs (Current):   Vitals:    03/31/23 1004   BP: 129/87   Pulse: 69   Resp: 16   Temp: 97 °F (36.1 °C)   TempSrc: Temporal   SpO2: 99%   Weight: 171 lb 4.8 oz (77.7 kg)   Height: 5' 6\" (1.676 m)                                              BP Readings from Last 3 Encounters:   03/31/23 129/87       NPO Status: Time of last liquid consumption: 2000                        Time of last solid consumption: 2000                        Date of last liquid consumption: 03/30/23                        Date of last solid food consumption: 03/30/23    BMI:   Wt Readings from Last 3 Encounters:   03/31/23 171 lb 4.8 oz (77.7 kg)   03/23/23 168 lb 9.6 oz (76.5 kg)   03/24/23 168 lb (76.2 kg)     Body mass index is 27.65 kg/m². CBC:   Lab Results   Component Value Date/Time    WBC 5.2 03/23/2023 02:14 PM    RBC 4.12 03/23/2023 02:14 PM    HGB 12.3 03/23/2023 02:14 PM    HCT 38.0 03/23/2023 02:14 PM    MCV 92.2 03/23/2023 02:14 PM    RDW 14.7 03/23/2023 02:14 PM     03/23/2023 02:14 PM       CMP:   Lab Results   Component Value Date/Time     03/23/2023 02:14 PM    K 4.5 03/23/2023 02:14 PM     03/23/2023 02:14 PM    CO2 29 03/23/2023 02:14 PM    BUN 10 03/23/2023 02:14 PM    CREATININE 1.08 03/23/2023 02:14 PM    GFRAA >60 08/29/2022 04:03 PM    LABGLOM >60 03/23/2023 02:14 PM    GLUCOSE 84 03/23/2023 02:14 PM    CALCIUM 9.4 03/23/2023 02:14 PM       POC Tests: No results for input(s): POCGLU, POCNA, POCK, POCCL, POCBUN, POCHEMO, POCHCT in the last 72 hours.     Coags: No results found for: PROTIME, INR, APTT    HCG (If Applicable):   Lab Results   Component Value Date    PREGTESTUR Negative

## 2023-03-31 NOTE — OP NOTE
[facility]  OPERATIVE REPORT    Name:  Dean Hoffman  MR#:   [id number]  :  1965  ACCOUNT #:  [de-identified]  DATE OF SERVICE:  2023    PREOPERATIVE DIAGNOSIS:  Right foot hallux rigidus. POSTOPERATIVE DIAGNOSES:  Right foot hallux rigidus with degenerative joint disease of first metatarsophalangeal joint. PROCEDURES PERFORMED:  First metatarsophalangeal joint butch-implant and scar resection. SURGEON:  Jesenia Calabrese MD    ASSISTANT:  nurse. ANESTHESIA:  Local and IV sedation. COMPLICATIONS:  None. SPECIMENS REMOVED:  bone spurs. IMPLANTS:  Bio pro metallic 1st proximal phalanx right foot implant. ESTIMATED BLOOD LOSS:  0 to 1 mL. TOURNIQUET TIME:  75 minutes. PROCEDURE:  The patient was taken to the OR, placed in a supine position on the operating room table. Local and IV sedation were achieved. The preoperative local consisted of 50:50 mixture of 0.25% Marcaine plain and 1% lidocaine plain for a total of 10 mL. Postoperatively, an additional 10 mL of Marcaine was given along with 1 mL of dexamethasone phosphate and 0.5 mL of Kenalog 10. The right lower extremity was prepped and draped in the usual sterile manner. Draping was sterile as usual and a Webril was placed around the right ankle. Tourniquet was placed at that level and inflated to 250 mmHg. Utilizing a serial #15, I made a linear incision through the previous incision on the dorsal, slightly medial side of the first metatarsophalangeal joint. Prior to the incision, there was a zero range of motion of the first metatarsophalangeal joint and as I continued my dissection of blunt and sharp, bleeders were ligated as necessary and then went right from the subcutaneous tip of the bone because of the immense amount of scar tissue and the scar tissue was completely covering the first metatarsophalangeal joint. Once entering the joint, there was no implant as previously thought by the patient.   There metatarsophalangeal joint to include a square metatarsal head. I resected some of the osteophytic perforation and tried to slightly round the head for better range of motion. The wound was then flushed with copious amounts of normal saline. The first proximal phalanx correction had to be reshaped from the osteophyte perforation and deformed position at the articular surfaces of the first MPJ utilizing a sagittal saw and rasped to smooth. Utilizing the BioPro butch implant instrumentation, I placed a 56.8 metallic implant into the first proximal phalanx space and then I took the version of graft jacket with Belden 28 and applied to the head of the first metatarsal and I tacked it in place with 3-0 Vicryl and then also placing the subcutaneous tissue right over the top of that area and then I placed an amniotic sheet of graft over the top of the subcu and then continued closure with 4-0 Vicryl for subcu and 2-0 nylon for skin. Xeroform gauze was placed at the dorsum of the wound and multiple 4x4 fluffs and a Kerlix and Ace wrap. The patient was taken back to recovery room with vascular status intact and stable.       KEATON Morales/QUE_I/[email or web address]  D:  03/31/2023 15:19  T:  03/31/2023 18:33  JOB #:  [id number]

## 2023-03-31 NOTE — INTERVAL H&P NOTE
Update History & Physical    The patient's History and Physical of March 16, 2023 was reviewed with the patient and I examined the patient. There was no change. The surgical site was confirmed by the patient and me. Plan: The risks, benefits, expected outcome, and alternative to the recommended procedure have been discussed with the patient. Patient understands and wants to proceed with the procedure.      Electronically signed by Poornima Chavarria DPM on 3/31/2023 at 1:39 PM

## 2023-03-31 NOTE — PERIOP NOTE
Patient assisted to the restroom and back to stretcher without incident. Call bell within reach and no further needs at this time. Pt and family notified of delay in procedure start time.

## 2023-03-31 NOTE — ANESTHESIA POSTPROCEDURE EVALUATION
Post-Anesthesia Evaluation and Assessment    Cardiovascular Function/Vital Signs  Visit Vitals  /62   Pulse 66   Temp 97.8 °F (36.6 °C) (Temporal)   Resp 15   Ht 5' 6\" (1.676 m)   Wt 171 lb 4.8 oz (77.7 kg)   SpO2 100%   BMI 27.65 kg/m²       Patient is status post Procedure(s):  REMOVE OLD IMPLANT, APPLY NEW JOINT IMPLANT RIGHT GREAT TOE JOINT WITH C-ARM. Nausea/Vomiting: Controlled. Postoperative hydration reviewed and adequate. Pain:      Managed. Neurological Status: At baseline. Mental Status and Level of Consciousness: Arousable. Pulmonary Status:       Adequate oxygenation and airway patent. Complications related to anesthesia: None    Patient has met all discharge requirements.     Signed By: Cassie Hutson MD    March 31, 2023

## 2023-03-31 NOTE — PERIOP NOTE
TRANSFER - IN REPORT:    Verbal report received from [id number], RN on [facility]  being received from OR for routine progression of patient care      Report consisted of patient's Situation, Background, Assessment and   Recommendations(SBAR). Information from the following report(s) Intake/Output and MAR was reviewed with the receiving nurse. Opportunity for questions and clarification was provided. Assessment completed upon patient's arrival to unit and care assumed.

## 2023-03-31 NOTE — PERIOP NOTE
Reviewed PTA medication list with patient/caregiver and patient/caregiver denies any additional medications. Patient admits to having a responsible adult care for them at home for at least 24 hours after surgery. Patient encouraged to use gown warming system and informed that using said warming gown to regulate body temperature prior to a procedure has been shown to help reduce the risks of blood clots and infection. Patient's pharmacy of choice verified and documented in PTA medication section. Dual skin assessment & fall risk band verification completed with Negrito Huntley.

## 2023-03-31 NOTE — BRIEF OP NOTE
Brief Postoperative Note      Patient: Breana Fairbanks  YOB: 1965  MRN: [id number]    Date of Procedure: 3/31/2023    Pre-Op Diagnosis: HAMMERTOE RIGHT FOOT, RIGHT FOOT PAIN    Post-Op Diagnosis: Same       Procedure(s):  REMOVE OLD IMPLANT, APPLY NEW JOINT IMPLANT RIGHT GREAT TOE JOINT WITH C-ARM    Surgeon(s):  Eliseo Weber DPM    Assistant:  Surgical Assistant: Mukul Mejia    Anesthesia: Monitor Anesthesia Care    Estimated Blood Loss (mL): Minimal    Complications: None    Specimens:   * No specimens in log *    Implants:  Implant Name Type Inv.  Item Serial No.  Lot No. LRB No. Used Action   GRAFT DERMAL N FEN 8X4 CMX1-2 MM DERMAL MTRX PARADERM - [id number]  GRAFT DERMAL N FEN 8X4 CMX1-2 MM DERMAL MTRX PARADERM [id number] PARAGON 28-WD  Right 1 Implanted   IMPLANT TOE JT 21.5MM M L CO CHROM POR 1ST MT COAT IMPLABLE - [id number]  IMPLANT TOE JT 21.5MM M L CO CHROM POR 1ST MT COAT IMPLABLE [id number]WD [id number] Right 1 Implanted   AMNIOTIC MEMBRANE, PLACENTA, 4CM X 8CM   [id number]   Right 1 Implanted         Drains: * No LDAs found *    Findings: The patient tolerated the procedure and anesthesia well    Electronically signed by Eliseo Weber DPM on 3/31/2023 at 3:25 PM

## 2023-08-07 ENCOUNTER — HOSPITAL ENCOUNTER (OUTPATIENT)
Facility: HOSPITAL | Age: 58
Setting detail: RECURRING SERIES
Discharge: HOME | End: 2023-08-10
Payer: OTHER GOVERNMENT

## 2023-08-07 PROCEDURE — 97161 PT EVAL LOW COMPLEX 20 MIN: CPT

## 2023-08-07 PROCEDURE — 97535 SELF CARE MNGMENT TRAINING: CPT

## 2023-08-07 PROCEDURE — 97110 THERAPEUTIC EXERCISES: CPT

## 2023-08-07 PROCEDURE — 97140 MANUAL THERAPY 1/> REGIONS: CPT

## 2023-08-07 NOTE — PROGRESS NOTES
[facility] at [facility], [address] [place] [address] Drive  Phone: [phone]   Fax: [phone]    Plan of Care/ Statement of Necessity for Physical Therapy Services        Patient name: John Mueller Start of Care: 2023   Referral source: Lindsay Riojas DO : 1965    Medical Diagnosis: Pain in right foot [M79.671]      Onset Date:DOS 3/31/2023    Treatment Diagnosis:  M79.671  RIGHT FOOT PAIN                                           Prior Hospitalization: see medical history Provider#: [id number]   Medications: Verified on Patient Summary List     Comorbidities: none per pt  Prior Level of Function: functionally independent, no AD, active lifestyle, running 4-5 days/week for a total of about 15-20 miles/week. The Plan of Care and following information is based on the information from the initial evaluation. Assessment/ key information: Pt is a 65year old female presenting with c/o right 1st toe surgery with DOS late 2023. States she had a previous surgery involving cadaver bone and an implant in  but had persistent swelling and ROM difficulties. States she had a metal implant placed this time reportedly a 1st MTP replacement per pt. States she still has difficulties with going up and down stairs and notes she has trouble getting her toe to bend with discomfort walking and running. She demonstrates poor 1st MTP AROM/PROM with firm end feels, limited scar mobility with TTP, altered gait mechanics, and mild right plantarflexion weakness though limited by limited 1st MTP ROM with assessment of strength. Pt will benefit from skilled Pt to address their impairments and facilitate improved functional status.     Evaluation Complexity HistoryMEDIUM  Complexity : 1-2 comorbidities / personal factors will impact the outcome/ POC  ; Examination MEDIUM Complexity : 3 Standardized tests and measures addressin body structure, function, activity limitation and / or
assessment of strength. Pt will benefit from skilled Pt to address their impairments and facilitate improved functional status. Patient will continue to benefit from skilled PT / OT services to modify and progress therapeutic interventions, analyze and address functional mobility deficits, analyze and address ROM deficits, analyze and address strength deficits, analyze and address soft tissue restrictions, analyze and cue for proper movement patterns, analyze and address imbalance/dizziness, and instruct in home and community integration to address functional deficits and attain remaining goals. Progress toward goals / Updated goals:  []  See Progress Note/Recertification  Short Term Goals: To be accomplished in 2 weeks   Patient will be independent and compliant with HEP to progress toward goals and restore functional mobility. Eval Status: issued at eval    Pt will demonstrate 1st MTP ext PROM to 40 degrees or better to improve mechanics for ambulation and running tasks. Eval Status: 17 degrees    Long Term Goals: To be accomplished in 6 weeks   Patient will improve FOTO score to 77 points to indicate significant improvement in functional status. Eval Status: FOTO 71  FOTO score = an established functional score where 100 = no disability    Pt will have 5/5 ankle PF strength to return to goals of walking and running with improved ease. Ariane Sour Status: 5/5    Patient will improve pain in right foot to 2/10 at worst to improve daily activity and walking tolerance and restore prior level of function. Eval Status: 5/10 at worst    Pt demonstrate ability to reciprocally negotiate stairs void of AD with good mechanics void of compensatory movement to improve ease of community mobility.   Eval Status: pain limited stair negotiation per pt, not assessed due to TC during IE    PLAN  yes Continue plan of care  []  Upgrade activities as tolerated  []  Discharge due to :  [x]  Other: emphasize 1st MTP ROM    Mani Coats

## 2023-08-14 ENCOUNTER — HOSPITAL ENCOUNTER (OUTPATIENT)
Facility: HOSPITAL | Age: 58
Setting detail: RECURRING SERIES
Discharge: HOME | End: 2023-08-17
Payer: OTHER GOVERNMENT

## 2023-08-14 PROCEDURE — 97140 MANUAL THERAPY 1/> REGIONS: CPT

## 2023-08-14 PROCEDURE — 97112 NEUROMUSCULAR REEDUCATION: CPT

## 2023-08-14 PROCEDURE — 97110 THERAPEUTIC EXERCISES: CPT

## 2023-08-14 NOTE — PROGRESS NOTES
PHYSICAL / OCCUPATIONAL THERAPY - DAILY TREATMENT NOTE (updated )    Patient Name: Yoli Kwong    Date: 2023    : 1965  Insurance: Payor: [employer] / Plan: [employer] / Product Type: *No Product type* /      Patient  verified Yes     Visit #   Current / Total 2 12   Time   In / Out 10:12 am 11:00 am   Pain   In / Out 4 4   Subjective Functional Status/Changes: Pt reports she has been working on her Hep but has been having trouble doing one exercise due to pain. States otherwise, doing well. Changes to: Allergies, Med Hx, Sx Hx?   no       TREATMENT AREA =  Pain in right foot [M79.671]    OBJECTIVE  Therapeutic Procedures: Tx Min Billable or 1:1 Min (if diff from Tx Min) Procedure, Rationale, Specifics   30  97990 Therapeutic Exercise (timed):  increase ROM, strength, coordination, balance, and proprioception to improve patient's ability to progress to PLOF and address remaining functional goals. (see flow sheet as applicable)     Details if applicable:       10  69807 Neuromuscular Re-Education (timed):  improve balance, coordination, kinesthetic sense, posture, core stability and proprioception to improve patient's ability to develop conscious control of individual muscles and awareness of position of extremities in order to progress to PLOF and address remaining functional goals. (see flow sheet as applicable)     Details if applicable:     8  97120 Manual Therapy (timed):  decrease pain, increase ROM, and increase tissue extensibility to improve patient's ability to progress to PLOF and address remaining functional goals. The manual therapy interventions were performed at a separate and distinct time from the therapeutic activities interventions .  (see flow sheet as applicable)     Details if applicable:  grade 1-2 1st MTP mobs to improve DF and PF, low load 1st MTP DF and PF stretch          Details if applicable:            Details if applicable:     50  MC BC Totals

## 2023-08-18 ENCOUNTER — HOSPITAL ENCOUNTER (OUTPATIENT)
Facility: HOSPITAL | Age: 58
Setting detail: RECURRING SERIES
Discharge: HOME | End: 2023-08-21
Payer: OTHER GOVERNMENT

## 2023-08-18 PROCEDURE — 97110 THERAPEUTIC EXERCISES: CPT

## 2023-08-18 PROCEDURE — 97530 THERAPEUTIC ACTIVITIES: CPT

## 2023-08-18 PROCEDURE — 97140 MANUAL THERAPY 1/> REGIONS: CPT

## 2023-08-18 PROCEDURE — 97112 NEUROMUSCULAR REEDUCATION: CPT

## 2023-08-18 NOTE — PROGRESS NOTES
PHYSICAL / OCCUPATIONAL THERAPY - DAILY TREATMENT NOTE (updated )    Patient Name: Gisselle Whatley    Date: 2023    : 1965  Insurance: Payor:  EAST / Plan:  EAST / Product Type: *No Product type* /      Patient  verified Yes     Visit #   Current / Total 3 12   Time   In / Out 10:51 11:31   Pain   In / Out 4 3   Subjective Functional Status/Changes: Patient reports running this morning. Reports pain when she starts and pain through the whole run. Changes to: Allergies, Med Hx, Sx Hx?   no       TREATMENT AREA =  Pain in right foot [M79.671]    OBJECTIVE    Therapeutic Procedures: Tx Min Billable or 1:1 Min (if diff from Tx Min) Procedure, Rationale, Specifics   12  09623 Therapeutic Exercise (timed):  increase ROM, strength, coordination, balance, and proprioception to improve patient's ability to progress to PLOF and address remaining functional goals. (see flow sheet as applicable)     Details if applicable:       10  31263 Neuromuscular Re-Education (timed):  improve balance, coordination, kinesthetic sense, posture, core stability and proprioception to improve patient's ability to develop conscious control of individual muscles and awareness of position of extremities in order to progress to PLOF and address remaining functional goals. (see flow sheet as applicable)     Details if applicable:     9  66091 Therapeutic Activity (timed):  use of dynamic activities replicating functional movements to increase ROM, strength, coordination, balance, and proprioception in order to improve patient's ability to progress to PLOF and address remaining functional goals. (see flow sheet as applicable)     Details if applicable:  shoes education and rec   9  02229 Manual Therapy (timed):  decrease pain, increase ROM, and increase tissue extensibility to improve patient's ability to progress to PLOF and address remaining functional goals.   The manual therapy interventions were performed

## 2023-08-21 ENCOUNTER — HOSPITAL ENCOUNTER (OUTPATIENT)
Facility: HOSPITAL | Age: 58
Setting detail: RECURRING SERIES
Discharge: HOME | End: 2023-08-24
Payer: OTHER GOVERNMENT

## 2023-08-21 PROCEDURE — 97530 THERAPEUTIC ACTIVITIES: CPT

## 2023-08-21 PROCEDURE — 97140 MANUAL THERAPY 1/> REGIONS: CPT

## 2023-08-21 PROCEDURE — 97112 NEUROMUSCULAR REEDUCATION: CPT

## 2023-08-21 PROCEDURE — 97110 THERAPEUTIC EXERCISES: CPT

## 2023-08-25 ENCOUNTER — APPOINTMENT (OUTPATIENT)
Facility: HOSPITAL | Age: 58
End: 2023-08-25
Payer: OTHER GOVERNMENT

## 2023-08-28 ENCOUNTER — HOSPITAL ENCOUNTER (OUTPATIENT)
Facility: HOSPITAL | Age: 58
Setting detail: RECURRING SERIES
Discharge: HOME | End: 2023-08-31
Payer: OTHER GOVERNMENT

## 2023-08-28 PROCEDURE — 97112 NEUROMUSCULAR REEDUCATION: CPT

## 2023-08-28 PROCEDURE — 97530 THERAPEUTIC ACTIVITIES: CPT

## 2023-08-28 PROCEDURE — 97110 THERAPEUTIC EXERCISES: CPT

## 2023-08-28 NOTE — PROGRESS NOTES
[facility] at [facility], [address]  Phone: [phone]   Fax: [phone]    Discharge Summary    Patient name: Jensen Miller     Start of Care: 2023  Referral source: Citlali Che DO    : 1965  Medical/Treatment Diagnosis: Pain in right foot [M79.671]  Onset Date:DOS 3/31/2023  Prior Hospitalization: see medical history   Provider#: [id number]  Medications: Verified on Patient Summary List    Comorbidities: none per pt  Prior Level of Function:functionally independent, no AD, active lifestyle, running 4-5 days/week for a total of about 15-20 miles/week. Visits from Start of Care: 5    Missed Visits: 0    Goals/Measure of Progress:  Short Term Goals: To be accomplished in 2 weeks              Patient will be independent and compliant with HEP to progress toward goals and restore functional mobility. Eval Status: issued at eval  Current: Met, 2023     Pt will demonstrate 1st MTP ext PROM to 40 degrees or better to improve mechanics for ambulation and running tasks. Eval Status: 17 degrees  Current: Not Met, 20 degrees,      Long Term Goals: To be accomplished in 6 weeks   Patient will improve FOTO score to 77 points to indicate significant improvement in functional status. Eval Status: FOTO 71  FOTO score = an established functional score where 100 = no disability  Current: Not met, 59, 2023      Pt will have 5/5 ankle PF strength to return to goals of walking and running with improved ease. Regino Acosta Status: 4/5  Current: Not Met 4/5, 2023     Patient will improve pain in right foot to 2/10 at worst to improve daily activity and walking tolerance and restore prior level of function. Eval Status: 5/10 at worst  Current: Not met 3/10, 2023     Pt demonstrate ability to reciprocally negotiate stairs void of AD with good mechanics void of compensatory movement to improve ease of community mobility.   Eval Status: pain limited
Physical Therapy Discharge Instructions    [facility] at [address] [facility],[address]  Phone: [phone]   Fax: [phone]      Patient: Benjamin Zhu  : 1965    Continue Home Exercise Program  3-4 times per week             Follow up with MD:     [] Upon completion of therapy     [x] As needed      Recommendations:     [x]   Return to activity with home program    [x]   Return to activity with the following modifications:       [x]Post Rehab Program    []Join Independent aquatic program     []Return to/join local gym      Additional Comments: Please contact clinic at above phone number if you have any questions regarding Home Exercise Program or self care instructions.
demonstrate ability to reciprocally negotiate stairs void of AD with good mechanics void of compensatory movement to improve ease of community mobility.   Eval Status: pain limited stair negotiation per pt, not assessed due to TC during IE  Current: Not Met, patient can ascend stairs without limitations    PLAN   Continue plan of care  []  Upgrade activities as tolerated  [x]  Discharge due to : per patient request  []  Other:    Nereida Hammans, PT    8/28/2023    10:11 AM    Future Appointments   Date Time Provider [address] [place] [address]   9/1/2023  9:30 AM [place], PT [facility]

## 2024-01-01 ENCOUNTER — HOSPITAL ENCOUNTER (EMERGENCY)
Facility: HOSPITAL | Age: 59
Discharge: HOME | End: 2024-01-01

## 2024-01-01 VITALS
TEMPERATURE: 98.3 F | DIASTOLIC BLOOD PRESSURE: 69 MMHG | BODY MASS INDEX: 26.52 KG/M2 | HEART RATE: 83 BPM | OXYGEN SATURATION: 100 % | HEIGHT: 66 IN | SYSTOLIC BLOOD PRESSURE: 118 MMHG | RESPIRATION RATE: 16 BRPM | WEIGHT: 165 LBS

## 2024-01-01 DIAGNOSIS — J02.9 PHARYNGITIS, UNSPECIFIED ETIOLOGY: Primary | ICD-10-CM

## 2024-01-01 LAB — S PYO AG THROAT QL: NEGATIVE

## 2024-01-01 PROCEDURE — 87880 STREP A ASSAY W/OPTIC: CPT

## 2024-01-01 PROCEDURE — 99283 EMERGENCY DEPT VISIT LOW MDM: CPT

## 2024-01-01 PROCEDURE — 87070 CULTURE OTHR SPECIMN AEROBIC: CPT

## 2024-01-01 ASSESSMENT — PAIN DESCRIPTION - LOCATION: LOCATION: THROAT

## 2024-01-01 ASSESSMENT — PAIN SCALES - GENERAL: PAINLEVEL_OUTOF10: 5

## 2024-01-01 ASSESSMENT — PAIN - FUNCTIONAL ASSESSMENT: PAIN_FUNCTIONAL_ASSESSMENT: 0-10

## 2024-01-01 NOTE — DISCHARGE INSTRUCTIONS
Your exam today with no acute concerns  Rapid strep is negative.  We will send for throat culture.  If positive we will call to add antibiotic.  Stay well-hydrated  Gargle with warm salt water, Chloraseptic or lozenges.  Humidifier at night all can be helpful.  Can try Tylenol and/or Motrin  Return if any new or worsening symptoms or new concerns

## 2024-01-01 NOTE — ED PROVIDER NOTES
used.    Jo-Ann Blue is a 58 y.o. female presenting to the Emergency Department with complaints of throat pain.   Patient presents the ED with sore throat pain.  She states pain is noticeable with swallowing.  She has been no fever, chills, rhinorrhea, neck stiffness, pain with neck movement, shortness of breath or chest pain.  No foreign body sensation.  She is able to eat and drink normally.  No medications taken for symptoms today.  No others with similar symptoms.  No other concerns.  Patient does have history of myasthenia gravis.  Currently on no medications.  No complaints regarding that diagnosis at this time    I have reviewed all PMHX, FMHX and Social Hx as entered into the medical record in the chart below using the Epic Template.    Review of Systems:  Constitutional: neg for fever, chills  ENT:  + sore throat. No rhinorrhea. No ear pain. No mouth trauma.   Respiratory:  neg for cough, shortness of breath  Cardiovascular:  neg for chest pain  GI:  neg for abdominal pain.  :  No Flank pain.  Integumentary: no rashes, or skin trauma  Neurological: neg for headaches  All other systems reviewed negative with exception of positives in ROS and HPI.    Past Medical History:  Past Medical History:   Diagnosis Date    Back pain     due to injury while in - not currently an issue    Exercise tolerance finding     can climb 2 flights of stairs without stopping for CP/SOB. Treadmill 50 minutes- 3 times per week    Hypercholesteremia     Hypothyroid 1994    Graves disease    Myasthenia gravis (HCC) 2015    Autoimmune, followed by PCP    Tremor in face related to the MG.    Wears glasses     driving       Past Surgical History:  Past Surgical History:   Procedure Laterality Date    ARTHRODESIS Right 3/31/2023    REMOVE OLD IMPLANT, APPLY NEW JOINT IMPLANT RIGHT GREAT TOE JOINT WITH C-ARM performed by Irvin Zabala DPM at [facility] MAIN OR    ORTHOPEDIC SURGERY  09/02/2021    right great toe  the ED-  Medications - No data to display    Please note that this dictation was completed with [employer], the computer voice recognition software.  Quite often unanticipated grammatical, syntax, homophones, and other interpretive errors are inadvertently transcribed by the computer software.  Please disregard these errors.  Please excuse any errors that have escaped final proofreading.       Roxana Menard PA-C  01/01/24 [id number]

## 2024-01-04 LAB
BACTERIA SPEC CULT: NORMAL
SERVICE CMNT-IMP: NORMAL

## 2024-05-14 ENCOUNTER — APPOINTMENT (OUTPATIENT)
Facility: HOSPITAL | Age: 59
End: 2024-05-14

## 2024-05-14 ENCOUNTER — HOSPITAL ENCOUNTER (EMERGENCY)
Facility: HOSPITAL | Age: 59
Discharge: HOME | End: 2024-05-14

## 2024-05-14 VITALS
DIASTOLIC BLOOD PRESSURE: 94 MMHG | HEIGHT: 66 IN | RESPIRATION RATE: 20 BRPM | TEMPERATURE: 98.9 F | SYSTOLIC BLOOD PRESSURE: 131 MMHG | BODY MASS INDEX: 26.52 KG/M2 | HEART RATE: 97 BPM | OXYGEN SATURATION: 100 % | WEIGHT: 165 LBS

## 2024-05-14 DIAGNOSIS — J20.9 ACUTE BRONCHITIS WITH BRONCHOSPASM: Primary | ICD-10-CM

## 2024-05-14 LAB
FLUAV RNA SPEC QL NAA+PROBE: NOT DETECTED
FLUBV RNA SPEC QL NAA+PROBE: NOT DETECTED
SARS-COV-2 RNA RESP QL NAA+PROBE: NOT DETECTED

## 2024-05-14 PROCEDURE — 99284 EMERGENCY DEPT VISIT MOD MDM: CPT

## 2024-05-14 PROCEDURE — 71046 X-RAY EXAM CHEST 2 VIEWS: CPT

## 2024-05-14 PROCEDURE — 87636 SARSCOV2 & INF A&B AMP PRB: CPT

## 2024-05-14 RX ORDER — METHYLPREDNISOLONE 4 MG/1
TABLET ORAL
Qty: 1 KIT | Refills: 0 | Status: SHIPPED | OUTPATIENT
Start: 2024-05-14

## 2024-05-14 RX ORDER — ALBUTEROL SULFATE 90 UG/1
2 AEROSOL, METERED RESPIRATORY (INHALATION) EVERY 6 HOURS PRN
Qty: 18 G | Refills: 0 | Status: SHIPPED | OUTPATIENT
Start: 2024-05-14

## 2024-05-14 RX ORDER — INHALER, ASSIST DEVICES
SPACER (EA) MISCELLANEOUS
Qty: 1 EACH | Refills: 0 | Status: SHIPPED | OUTPATIENT
Start: 2024-05-14

## 2024-05-14 RX ORDER — BENZONATATE 100 MG/1
100 CAPSULE ORAL 3 TIMES DAILY PRN
Qty: 21 CAPSULE | Refills: 0 | Status: SHIPPED | OUTPATIENT
Start: 2024-05-14 | End: 2024-05-21

## 2024-05-14 NOTE — ED PROVIDER NOTES
norethindrone-ethinyl estradiol-Fe 1 MG-10 MCG / 10 MCG tablet  Commonly known as: LO LOESTRIN FE     rosuvastatin 20 MG tablet  Commonly known as: CRESTOR               Where to Get Your Medications        These medications were sent to [id number] - [place], [place] - [address] - P [phone] - F [phone] [address] [facility] [address]      Phone: [phone]   albuterol sulfate  (90 Base) MCG/ACT inhaler  benzonatate 100 MG capsule  Compact Space Chamber Nancy  methylPREDNISolone 4 MG tablet                  I am the Primary Clinician of Record.       (Please note that parts of this dictation were completed with voice recognition software. Quite often unanticipated grammatical, syntax, homophones, and other interpretive errors are inadvertently transcribed by the computer software. Please disregards these errors. Please excuse any errors that have escaped final proofreading.)       Sebastian Taylor PA-C  05/14/24 1542

## 2024-10-02 ENCOUNTER — APPOINTMENT (OUTPATIENT)
Facility: HOSPITAL | Age: 59
End: 2024-10-02
Payer: OTHER GOVERNMENT

## 2024-10-02 ENCOUNTER — HOSPITAL ENCOUNTER (EMERGENCY)
Facility: HOSPITAL | Age: 59
Discharge: HOME | End: 2024-10-03
Attending: STUDENT IN AN ORGANIZED HEALTH CARE EDUCATION/TRAINING PROGRAM
Payer: OTHER GOVERNMENT

## 2024-10-02 DIAGNOSIS — R07.9 CHEST PAIN, UNSPECIFIED TYPE: Primary | ICD-10-CM

## 2024-10-02 LAB
ALBUMIN SERPL-MCNC: 3.5 G/DL (ref 3.4–5)
ALBUMIN/GLOB SERPL: 1 (ref 0.8–1.7)
ALP SERPL-CCNC: 71 U/L (ref 45–117)
ALT SERPL-CCNC: 21 U/L (ref 13–56)
ANION GAP SERPL CALC-SCNC: 5 MMOL/L (ref 3–18)
AST SERPL-CCNC: 33 U/L (ref 10–38)
BASOPHILS # BLD: 0 K/UL (ref 0–0.1)
BASOPHILS NFR BLD: 1 % (ref 0–2)
BILIRUB SERPL-MCNC: 0.3 MG/DL (ref 0.2–1)
BUN SERPL-MCNC: 14 MG/DL (ref 7–18)
BUN/CREAT SERPL: 12 (ref 12–20)
CALCIUM SERPL-MCNC: 9.4 MG/DL (ref 8.5–10.1)
CHLORIDE SERPL-SCNC: 107 MMOL/L (ref 100–111)
CO2 SERPL-SCNC: 25 MMOL/L (ref 21–32)
CREAT SERPL-MCNC: 1.21 MG/DL (ref 0.6–1.3)
DIFFERENTIAL METHOD BLD: ABNORMAL
EOSINOPHIL # BLD: 0.3 K/UL (ref 0–0.4)
EOSINOPHIL NFR BLD: 5 % (ref 0–5)
ERYTHROCYTE [DISTWIDTH] IN BLOOD BY AUTOMATED COUNT: 13.3 % (ref 11.6–14.5)
GLOBULIN SER CALC-MCNC: 3.6 G/DL (ref 2–4)
GLUCOSE SERPL-MCNC: 97 MG/DL (ref 74–99)
HCT VFR BLD AUTO: 38 % (ref 35–45)
HGB BLD-MCNC: 12.6 G/DL (ref 12–16)
IMM GRANULOCYTES # BLD AUTO: 0 K/UL (ref 0–0.04)
IMM GRANULOCYTES NFR BLD AUTO: 0 % (ref 0–0.5)
LYMPHOCYTES # BLD: 2.5 K/UL (ref 0.9–3.6)
LYMPHOCYTES NFR BLD: 46 % (ref 21–52)
MCH RBC QN AUTO: 30.7 PG (ref 24–34)
MCHC RBC AUTO-ENTMCNC: 33.2 G/DL (ref 31–37)
MCV RBC AUTO: 92.5 FL (ref 78–100)
MONOCYTES # BLD: 0.5 K/UL (ref 0.05–1.2)
MONOCYTES NFR BLD: 10 % (ref 3–10)
NEUTS SEG # BLD: 2.1 K/UL (ref 1.8–8)
NEUTS SEG NFR BLD: 38 % (ref 40–73)
NRBC # BLD: 0 K/UL (ref 0–0.01)
NRBC BLD-RTO: 0 PER 100 WBC
PLATELET # BLD AUTO: 243 K/UL (ref 135–420)
PMV BLD AUTO: 10.4 FL (ref 9.2–11.8)
POTASSIUM SERPL-SCNC: 4.9 MMOL/L (ref 3.5–5.5)
PROT SERPL-MCNC: 7.1 G/DL (ref 6.4–8.2)
RBC # BLD AUTO: 4.11 M/UL (ref 4.2–5.3)
SODIUM SERPL-SCNC: 137 MMOL/L (ref 136–145)
TROPONIN I SERPL HS-MCNC: 19 NG/L (ref 0–54)
WBC # BLD AUTO: 5.5 K/UL (ref 4.6–13.2)

## 2024-10-02 PROCEDURE — 85025 COMPLETE CBC W/AUTO DIFF WBC: CPT

## 2024-10-02 PROCEDURE — 99285 EMERGENCY DEPT VISIT HI MDM: CPT

## 2024-10-02 PROCEDURE — 80053 COMPREHEN METABOLIC PANEL: CPT

## 2024-10-02 PROCEDURE — 84484 ASSAY OF TROPONIN QUANT: CPT

## 2024-10-02 PROCEDURE — 93005 ELECTROCARDIOGRAM TRACING: CPT | Performed by: STUDENT IN AN ORGANIZED HEALTH CARE EDUCATION/TRAINING PROGRAM

## 2024-10-02 ASSESSMENT — PAIN DESCRIPTION - LOCATION: LOCATION: CHEST

## 2024-10-02 ASSESSMENT — PAIN DESCRIPTION - DESCRIPTORS: DESCRIPTORS: PRESSURE

## 2024-10-02 ASSESSMENT — PAIN SCALES - GENERAL: PAINLEVEL_OUTOF10: 4

## 2024-10-02 ASSESSMENT — PAIN - FUNCTIONAL ASSESSMENT: PAIN_FUNCTIONAL_ASSESSMENT: 0-10

## 2024-10-03 ENCOUNTER — APPOINTMENT (OUTPATIENT)
Facility: HOSPITAL | Age: 59
End: 2024-10-03
Payer: OTHER GOVERNMENT

## 2024-10-03 VITALS
OXYGEN SATURATION: 100 % | HEART RATE: 55 BPM | BODY MASS INDEX: 27.32 KG/M2 | RESPIRATION RATE: 16 BRPM | DIASTOLIC BLOOD PRESSURE: 79 MMHG | HEIGHT: 66 IN | SYSTOLIC BLOOD PRESSURE: 125 MMHG | WEIGHT: 170 LBS

## 2024-10-03 LAB
EKG ATRIAL RATE: 58 BPM
EKG DIAGNOSIS: NORMAL
EKG P AXIS: 62 DEGREES
EKG P-R INTERVAL: 214 MS
EKG Q-T INTERVAL: 392 MS
EKG QRS DURATION: 88 MS
EKG QTC CALCULATION (BAZETT): 384 MS
EKG R AXIS: 48 DEGREES
EKG T AXIS: 56 DEGREES
EKG VENTRICULAR RATE: 58 BPM
TROPONIN I SERPL HS-MCNC: 18 NG/L (ref 0–54)

## 2024-10-03 PROCEDURE — 71045 X-RAY EXAM CHEST 1 VIEW: CPT

## 2024-10-03 PROCEDURE — 84484 ASSAY OF TROPONIN QUANT: CPT

## 2024-10-03 ASSESSMENT — ENCOUNTER SYMPTOMS
VOMITING: 0
ABDOMINAL PAIN: 0
DIARRHEA: 0
CHEST TIGHTNESS: 0
SHORTNESS OF BREATH: 0
NAUSEA: 0

## 2024-10-03 NOTE — ED PROVIDER NOTES
EMERGENCY DEPARTMENT HISTORY AND PHYSICAL EXAM      Date: 10/2/2024  Patient Name: Jo-Ann Blue    History of Presenting Illness     Chief Complaint   Patient presents with    Chest Pain       58-year-old female with history of myasthenia gravis, hypercholesterolemia presenting to the emergency department for evaluation of chest pain.  Started approximately half an hour prior to arrival.  Started after getting out of the shower.  Patient describes a pressure-like sensation.  Called the EMS who administered her 324 ASA and times one 0.4 mg nitro.  Pain is since subsided.  She denies any cardiac history.          PCP: Bill Vang PA    No current facility-administered medications for this encounter.     Current Outpatient Medications   Medication Sig Dispense Refill    albuterol sulfate HFA (PROVENTIL;VENTOLIN;PROAIR) 108 (90 Base) MCG/ACT inhaler Inhale 2 puffs into the lungs every 6 hours as needed for Wheezing 18 g 0    Spacer/Aero-Holding Chambers (COMPACT SPACE CHAMBER) COTY Please provide one adult spacer to be used with HFA 1 each 0    methylPREDNISolone (MEDROL DOSEPACK) 4 MG tablet Take with food. 1 kit 0    levothyroxine (SYNTHROID) 125 MCG tablet Take 1 tablet by mouth every morning (before breakfast) Indications: hypo      norethindrone-ethinyl estradiol-Fe (LO LOESTRIN FE) 1 MG-10 MCG / 10 MCG tablet Take by mouth daily Indications: birth control (Patient not taking: Reported on 1/1/2024)      rosuvastatin (CRESTOR) 20 MG tablet Take 1 tablet by mouth nightly Indications: high cholesterol         Past History     Past Medical History:  Past Medical History:   Diagnosis Date    Back pain     due to injury while in - not currently an issue    Exercise tolerance finding     can climb 2 flights of stairs without stopping for CP/SOB. Treadmill 50 minutes- 3 times per week    Hypercholesteremia     Hypothyroid 1994    Graves disease    Myasthenia gravis (HCC) 2015    Autoimmune, followed by

## 2024-10-03 NOTE — DISCHARGE INSTRUCTIONS
Please make a follow-up appointment with your primary care doctor for further cardiac testing as necessary.  Return to the emergency department for any recurring, new, or worsening symptoms

## 2024-10-03 NOTE — ED TRIAGE NOTES
Patient to ED via EMS for reports of chest pain that began at home just after getting out of the shower. Patient denies major cardiac hx. EMS administered 324 ASA and x1 0.4mg SL nitro en route. Patient reports pressure in her chest has improved since nitro admin, rates it 4/10 on pain scale

## 2024-11-25 ENCOUNTER — APPOINTMENT (OUTPATIENT)
Facility: HOSPITAL | Age: 59
End: 2024-11-25
Payer: OTHER GOVERNMENT

## 2024-11-25 ENCOUNTER — HOSPITAL ENCOUNTER (EMERGENCY)
Facility: HOSPITAL | Age: 59
Discharge: HOME | End: 2024-11-26
Payer: OTHER GOVERNMENT

## 2024-11-25 VITALS
SYSTOLIC BLOOD PRESSURE: 135 MMHG | BODY MASS INDEX: 27.32 KG/M2 | DIASTOLIC BLOOD PRESSURE: 74 MMHG | HEART RATE: 72 BPM | OXYGEN SATURATION: 100 % | TEMPERATURE: 97.7 F | RESPIRATION RATE: 18 BRPM | HEIGHT: 66 IN | WEIGHT: 170 LBS

## 2024-11-25 DIAGNOSIS — M54.50 ACUTE RIGHT-SIDED LOW BACK PAIN WITHOUT SCIATICA: Primary | ICD-10-CM

## 2024-11-25 PROCEDURE — 72100 X-RAY EXAM L-S SPINE 2/3 VWS: CPT

## 2024-11-25 PROCEDURE — 96374 THER/PROPH/DIAG INJ IV PUSH: CPT

## 2024-11-25 PROCEDURE — 6360000002 HC RX W HCPCS: Performed by: EMERGENCY MEDICINE

## 2024-11-25 PROCEDURE — 99284 EMERGENCY DEPT VISIT MOD MDM: CPT

## 2024-11-25 RX ORDER — ACETAMINOPHEN 500 MG
1000 TABLET ORAL 4 TIMES DAILY PRN
Qty: 30 TABLET | Refills: 1 | Status: SHIPPED | OUTPATIENT
Start: 2024-11-25

## 2024-11-25 RX ORDER — MORPHINE SULFATE 4 MG/ML
4 INJECTION, SOLUTION INTRAMUSCULAR; INTRAVENOUS
Status: COMPLETED | OUTPATIENT
Start: 2024-11-25 | End: 2024-11-25

## 2024-11-25 RX ORDER — METHOCARBAMOL 750 MG/1
750 TABLET, FILM COATED ORAL 4 TIMES DAILY
Qty: 40 TABLET | Refills: 0 | Status: SHIPPED | OUTPATIENT
Start: 2024-11-25 | End: 2024-11-25

## 2024-11-25 RX ORDER — METHOCARBAMOL 750 MG/1
750 TABLET, FILM COATED ORAL 4 TIMES DAILY
Qty: 28 TABLET | Refills: 0 | Status: SHIPPED | OUTPATIENT
Start: 2024-11-25 | End: 2024-12-02

## 2024-11-25 RX ORDER — IBUPROFEN 800 MG/1
800 TABLET, FILM COATED ORAL 2 TIMES DAILY PRN
Qty: 30 TABLET | Refills: 1 | Status: SHIPPED | OUTPATIENT
Start: 2024-11-25

## 2024-11-25 RX ADMIN — MORPHINE SULFATE 4 MG: 4 INJECTION, SOLUTION INTRAMUSCULAR; INTRAVENOUS at 23:08

## 2024-11-25 ASSESSMENT — ENCOUNTER SYMPTOMS
COLOR CHANGE: 0
BACK PAIN: 1

## 2024-11-25 ASSESSMENT — PAIN SCALES - GENERAL: PAINLEVEL_OUTOF10: 10

## 2024-11-25 ASSESSMENT — PAIN DESCRIPTION - LOCATION: LOCATION: BACK

## 2024-11-26 NOTE — ED PROVIDER NOTES
[facility] EMERGENCY DEPT  EMERGENCY DEPARTMENT ENCOUNTER      Pt Name: Jo-Ann Blue  MRN: [id number]  Birthdate 1965  Date of evaluation: 11/25/2024  Provider: HERSON Nguyen  11:45 PM    CHIEF COMPLAINT       Chief Complaint   Patient presents with    Back Pain         HISTORY OF PRESENT ILLNESS    Jo-Ann Blue is a 59 y.o. female who presents to the emergency department with complaint of low back pain mostly on the right side since this weekend.  Friendship like she turned a couple of different times and we did gave her self Epson bath salts and ibuprofen Tylenol and was up and moving but today after the bath get out of the bathtub she felt like she tweaked it again.  Denies any saddle anesthesia bowel incontinence urinary retention fever rash IVDU or unintentional weight loss in the past 6 months.  Denies dysuria.  Has never had pain like this previously.    HPI    Nursing Notes were reviewed.    REVIEW OF SYSTEMS       Review of Systems   Constitutional:  Negative for fever and unexpected weight change.   Genitourinary:  Negative for difficulty urinating.   Musculoskeletal:  Positive for back pain.   Skin:  Negative for color change, rash and wound.   Neurological:  Negative for weakness and numbness.       Except as noted above the remainder of the review of systems was reviewed and negative.       PAST MEDICAL HISTORY     Past Medical History:   Diagnosis Date    Back pain     due to injury while in - not currently an issue    Exercise tolerance finding     can climb 2 flights of stairs without stopping for CP/SOB. Treadmill 50 minutes- 3 times per week    Hypercholesteremia     Hypothyroid 1994    Graves disease    Myasthenia gravis (HCC) 2015    Autoimmune, followed by PCP    Tremor in face related to the MG.    Wears glasses     driving         SURGICAL HISTORY       Past Surgical History:   Procedure Laterality Date    ARTHRODESIS Right 3/31/2023    REMOVE OLD IMPLANT, APPLY NEW JOINT IMPLANT

## (undated) DEVICE — NEEDLE HYPO 25GA L1IN BLU POLYPR HUB S STL REG BVL STR W/O

## (undated) DEVICE — SUTURE VCRL + SZ 3-0 L27IN ABSRB UD L26MM SH 1/2 CIR VCP416H

## (undated) DEVICE — GLOVE SURG SZ 85 L12IN FNGR THK79MIL GRN LTX FREE

## (undated) DEVICE — HANDPIECE SET WITH HIGH FLOW TIP AND SUCTION TUBE: Brand: INTERPULSE

## (undated) DEVICE — SOLUTION IRRIG 500ML 0.9% SOD CHLO USP POUR PLAS BTL

## (undated) DEVICE — SYRINGE TB 1ML NDL 27GA L0.5IN PLAS SLIP TIP CONVENTIONAL

## (undated) DEVICE — APPLICATOR MEDICATED 26 CC SOLUTION HI LT ORNG CHLORAPREP

## (undated) DEVICE — CONTAINER,SPECIMEN,OR STERILE,4OZ: Brand: MEDLINE

## (undated) DEVICE — BANDAGE COMPR W4INXL10YD WHITE/BEIGE E MTRX HK LOOP CLSR

## (undated) DEVICE — GARMENT,MEDLINE,DVT,INT,CALF,MED, GEN2: Brand: MEDLINE

## (undated) DEVICE — SUTURE COAT VCRL SZ 4-0 L18IN ABSRB UD L19MM PS-2 1/2 CIR J496G

## (undated) DEVICE — PRECISION (9.0 X 0.51 X 25.0MM)

## (undated) DEVICE — GLOVE ORANGE PI 8   MSG9080

## (undated) DEVICE — DISPOSABLE TOURNIQUET CUFF SINGLE BLADDER, SINGLE PORT AND QUICK CONNECT CONNECTOR: Brand: COLOR CUFF

## (undated) DEVICE — BANDAGE,GAUZE,BULKEE II,4.5"X4.1YD,STRL: Brand: MEDLINE

## (undated) DEVICE — SUTURE VCRL SZ 3-0 L18IN ABSRB UD PS-2 L19MM 1/2 CIR J497G

## (undated) DEVICE — DRESSING,GAUZE,XEROFORM,CURAD,1"X8",ST: Brand: CURAD

## (undated) DEVICE — SYRINGE MED 10ML TRNSLUC BRL PLUNG BLK MRK POLYPR CTRL

## (undated) DEVICE — INTENDED FOR TISSUE SEPARATION, AND OTHER PROCEDURES THAT REQUIRE A SHARP SURGICAL BLADE TO PUNCTURE OR CUT.: Brand: BARD-PARKER ® STAINLESS STEEL BLADES

## (undated) DEVICE — NEEDLE HYPO 25GA L1.5IN BLU POLYPR HUB S STL REG BVL STR

## (undated) DEVICE — PACK PROCEDURE SURG EXTREMITY CUST

## (undated) DEVICE — SUTURE VCRL SZ 4-0 L27IN ABSRB UD L19MM PS-2 3/8 CIR PRIM J426H

## (undated) DEVICE — SUTURE NONABSORBABLE MONOFILAMENT 2-0 FS 18 IN ETHILON 664H